# Patient Record
Sex: MALE | Race: WHITE | NOT HISPANIC OR LATINO | Employment: UNEMPLOYED | URBAN - METROPOLITAN AREA
[De-identification: names, ages, dates, MRNs, and addresses within clinical notes are randomized per-mention and may not be internally consistent; named-entity substitution may affect disease eponyms.]

---

## 2022-03-28 PROCEDURE — 99283 EMERGENCY DEPT VISIT LOW MDM: CPT | Mod: 25

## 2022-03-29 ENCOUNTER — TELEPHONE (OUTPATIENT)
Dept: ORTHOPEDICS | Facility: CLINIC | Age: 57
End: 2022-03-29
Payer: MEDICAID

## 2022-03-29 ENCOUNTER — HOSPITAL ENCOUNTER (EMERGENCY)
Facility: HOSPITAL | Age: 57
Discharge: HOME | End: 2022-03-29
Attending: EMERGENCY MEDICINE
Payer: MEDICAID

## 2022-03-29 VITALS
HEART RATE: 91 BPM | DIASTOLIC BLOOD PRESSURE: 70 MMHG | TEMPERATURE: 99 F | SYSTOLIC BLOOD PRESSURE: 140 MMHG | BODY MASS INDEX: 27.37 KG/M2 | OXYGEN SATURATION: 99 % | HEIGHT: 68 IN | RESPIRATION RATE: 16 BRPM

## 2022-03-29 DIAGNOSIS — G89.29 CHRONIC PAIN OF RIGHT KNEE: Primary | ICD-10-CM

## 2022-03-29 DIAGNOSIS — M25.561 RIGHT KNEE PAIN: ICD-10-CM

## 2022-03-29 DIAGNOSIS — M25.561 CHRONIC PAIN OF RIGHT KNEE: Primary | ICD-10-CM

## 2022-03-29 PROCEDURE — 25000003 PHARM REV CODE 250: Performed by: NURSE PRACTITIONER

## 2022-03-29 RX ORDER — HYDROCODONE BITARTRATE AND ACETAMINOPHEN 5; 325 MG/1; MG/1
1 TABLET ORAL
Status: COMPLETED | OUTPATIENT
Start: 2022-03-29 | End: 2022-03-29

## 2022-03-29 RX ORDER — HYDROCODONE BITARTRATE AND ACETAMINOPHEN 5; 325 MG/1; MG/1
1 TABLET ORAL EVERY 4 HOURS PRN
Qty: 18 TABLET | Refills: 0 | Status: SHIPPED | OUTPATIENT
Start: 2022-03-29

## 2022-03-29 RX ADMIN — HYDROCODONE BITARTRATE AND ACETAMINOPHEN 1 TABLET: 5; 325 TABLET ORAL at 12:03

## 2022-03-29 NOTE — FIRST PROVIDER EVALUATION
"Medical screening exam completed.  I have conducted a focused provider triage encounter, findings are as follows:    Brief history of present illness:  Patient presents with pain and swelling to the right knee.  Patient reports following and injuring his knee yesterday.  Reports having severe knee injury previously that was never fully healed.    Vitals:    03/28/22 2217 03/28/22 2220   BP:  (!) 164/71   BP Location:  Right arm   Patient Position:  Sitting   Pulse:  (!) 117   Resp:  20   Temp:  99 °F (37.2 °C)   TempSrc:  Oral   SpO2:  98%   Height: 5' 8" (1.727 m)        Pertinent physical exam:      Brief workup plan:      Preliminary workup initiated; this workup will be continued and followed by the physician or advanced practice provider that is assigned to the patient when roomed.  "

## 2022-03-29 NOTE — TELEPHONE ENCOUNTER
Returned call to patient and left a message that [facility] is not accepting new Medicaid patient's at this time with the Medicaid Escalation Hotline number.

## 2022-03-29 NOTE — TELEPHONE ENCOUNTER
----- Message from Brianna Cardenas sent at 3/29/2022  2:02 PM CDT -----  Contact: Evelyn/ spouse  Evelyn is needing a callback to get the patient scheduled for his knee. He was seen in the E.D on 3/29/2022.  Please call her back at [phone]

## 2022-03-29 NOTE — ED PROVIDER NOTES
Encounter Date: 3/28/2022       History     Chief Complaint   Patient presents with    Knee Injury     Over a year ago injured R knee and did not do the follow ups. Hit same knee last nigh when tripped out of a boat. Knee swollen and red.      Right knee pain and swellling after fall yesterday. History of knee fracture.  No distress noted at this time.  No relief with meds taken at home.        Review of patient's allergies indicates:  No Known Allergies  Past Medical History:   Diagnosis Date    HTN (hypertension)      Past Surgical History:   Procedure Laterality Date    HERNIA REPAIR       History reviewed. No pertinent family history.  Social History     Tobacco Use    Smoking status: Never Smoker    Smokeless tobacco: Current User     Types: Snuff   Substance Use Topics    Alcohol use: Yes    Drug use: Never     Review of Systems   Constitutional: Negative for fever.   HENT: Negative for sore throat.    Respiratory: Negative for shortness of breath.    Cardiovascular: Negative for chest pain.   Gastrointestinal: Negative for nausea.   Genitourinary: Negative for dysuria.   Musculoskeletal: Positive for arthralgias (right knee). Negative for back pain.   Skin: Negative for rash.   Neurological: Negative for weakness.   Hematological: Does not bruise/bleed easily.       Physical Exam     Initial Vitals [03/28/22 2220]   BP Pulse Resp Temp SpO2   (!) 164/71 (!) 117 20 99 °F (37.2 °C) 98 %      MAP       --         Physical Exam    Nursing note and vitals reviewed.  Constitutional: He appears well-developed and well-nourished.   HENT:   Head: Normocephalic and atraumatic.   Eyes: Conjunctivae and EOM are normal. Pupils are equal, round, and reactive to light.   Neck: Neck supple.   Normal range of motion.  Cardiovascular: Normal rate, regular rhythm, normal heart sounds and intact distal pulses.   Pulmonary/Chest: Breath sounds normal.   Abdominal: Abdomen is soft. Bowel sounds are normal.   Musculoskeletal:          General: Tenderness (right knee) and edema (right knee) present.      Cervical back: Normal range of motion and neck supple.     Neurological: He is alert and oriented to person, place, and time. He has normal strength and normal reflexes.   Skin: Skin is warm and dry. Capillary refill takes less than 2 seconds.   Psychiatric: He has a normal mood and affect. His behavior is normal. Judgment and thought content normal.         ED Course   Procedures  Labs Reviewed - No data to display       Imaging Results          X-Ray Knee Complete 4 Or More Views Right (Final result)  Result time 03/28/22 22:56:45    Final result by Nohemy Estrada MD (03/28/22 22:56:45)                 Impression:      Complete destruction of the knee joint with multiple cystic spaces and irregularity of the joint space extend to the tibial plateau and the femoral condyle.  Correlate clinically for a chronic process.    No acute fracture or dislocation    Consider MRI of the right knee with and without contrast on a nonemergent basis for further evaluation      Electronically signed by: Sean Slater  Date:    03/28/2022  Time:    22:56             Narrative:    EXAMINATION:  XR KNEE COMP 4 OR MORE VIEWS RIGHT    CLINICAL HISTORY:  Pain in right knee    TECHNIQUE:  AP, lateral, and Merchant views of the right knee were performed.    COMPARISON:  None    FINDINGS:  Severe irregularity of the joint space of the knee.  No acute fracture or dislocation.  The patella is not identified.  Periosteal reaction involving the distal femur and proximal tibia consistent with chronicity.                                 Medications   HYDROcodone-acetaminophen 5-325 mg per tablet 1 tablet (1 tablet Oral Given 3/29/22 0051)     Medical Decision Making:   ED Management:  I discussed with patient and/or family/caretaker that evaluation in the ED does not suggest any emergent or life threatening medical conditions requiring immediate intervention beyond  what was provided in the ED, and I believe patient is safe for discharge. Regardless, an unremarkable evaluation in the ED does not preclude the development or presence of a serious of life threatening condition. As such, patient was instructed to return immediately for any worsening or change in current symptoms.                        Clinical Impression:   Final diagnoses:  [M25.561] Right knee pain  [M25.561, G89.29] Chronic pain of right knee (Primary)          ED Disposition Condition    Discharge Stable        ED Prescriptions     Medication Sig Dispense Start Date End Date Auth. Provider    HYDROcodone-acetaminophen (NORCO) 5-325 mg per tablet Take 1 tablet by mouth every 4 (four) hours as needed for Pain. 18 tablet 3/29/2022  Herbert Brewer NP        Follow-up Information     Follow up With Specialties Details Why Contact Info    [facility]  Schedule an appointment as soon as possible for a visit   [address] [place] [address] [address]  [place] [id number]  [phone]             Herbert Brewer NP  04/02/22 7000

## 2023-01-01 ENCOUNTER — HOSPITAL ENCOUNTER (INPATIENT)
Facility: HOSPITAL | Age: 58
LOS: 2 days | Discharge: HOME | DRG: 369 | End: 2023-12-31
Attending: EMERGENCY MEDICINE | Admitting: INTERNAL MEDICINE
Payer: MEDICAID

## 2023-01-01 VITALS
RESPIRATION RATE: 18 BRPM | WEIGHT: 140.38 LBS | HEART RATE: 85 BPM | OXYGEN SATURATION: 98 % | SYSTOLIC BLOOD PRESSURE: 118 MMHG | BODY MASS INDEX: 21.28 KG/M2 | HEIGHT: 68 IN | TEMPERATURE: 98 F | DIASTOLIC BLOOD PRESSURE: 66 MMHG

## 2023-01-01 DIAGNOSIS — R19.8 PEPTIC ULCER SYMPTOMS: ICD-10-CM

## 2023-01-01 DIAGNOSIS — I50.42 CHRONIC COMBINED SYSTOLIC AND DIASTOLIC HEART FAILURE: ICD-10-CM

## 2023-01-01 DIAGNOSIS — D64.9 SYMPTOMATIC ANEMIA: ICD-10-CM

## 2023-01-01 DIAGNOSIS — K92.2 ACUTE UPPER GI BLEED: Primary | ICD-10-CM

## 2023-01-01 DIAGNOSIS — R07.9 CHEST PAIN: ICD-10-CM

## 2023-01-01 DIAGNOSIS — D62 ACUTE BLOOD LOSS ANEMIA: ICD-10-CM

## 2023-01-01 DIAGNOSIS — R10.13 ACUTE EPIGASTRIC PAIN: ICD-10-CM

## 2023-01-01 DIAGNOSIS — K92.2 UPPER GI BLEED: ICD-10-CM

## 2023-01-01 DIAGNOSIS — K92.1 MELENA: ICD-10-CM

## 2023-01-01 LAB
ABO + RH BLD: NORMAL
ALBUMIN SERPL BCP-MCNC: 2.8 G/DL (ref 3.5–5.2)
ALBUMIN SERPL BCP-MCNC: 2.9 G/DL (ref 3.5–5.2)
ALP SERPL-CCNC: 133 U/L (ref 55–135)
ALP SERPL-CCNC: 143 U/L (ref 55–135)
ALT SERPL W/O P-5'-P-CCNC: 13 U/L (ref 10–44)
ALT SERPL W/O P-5'-P-CCNC: 15 U/L (ref 10–44)
ANION GAP SERPL CALC-SCNC: 10 MMOL/L (ref 8–16)
ANION GAP SERPL CALC-SCNC: 10 MMOL/L (ref 8–16)
AST SERPL-CCNC: 22 U/L (ref 10–40)
AST SERPL-CCNC: 23 U/L (ref 10–40)
BASOPHILS # BLD AUTO: 0.03 K/UL (ref 0–0.2)
BASOPHILS # BLD AUTO: 0.03 K/UL (ref 0–0.2)
BASOPHILS # BLD AUTO: 0.04 K/UL (ref 0–0.2)
BASOPHILS # BLD AUTO: 0.04 K/UL (ref 0–0.2)
BASOPHILS # BLD AUTO: 0.06 K/UL (ref 0–0.2)
BASOPHILS # BLD AUTO: 0.07 K/UL (ref 0–0.2)
BASOPHILS NFR BLD: 0.5 % (ref 0–1.9)
BASOPHILS NFR BLD: 0.7 % (ref 0–1.9)
BASOPHILS NFR BLD: 0.8 % (ref 0–1.9)
BASOPHILS NFR BLD: 0.8 % (ref 0–1.9)
BILIRUB SERPL-MCNC: 0.6 MG/DL (ref 0.1–1)
BILIRUB SERPL-MCNC: 0.9 MG/DL (ref 0.1–1)
BLD GP AB SCN CELLS X3 SERPL QL: NORMAL
BLD PROD TYP BPU: NORMAL
BLOOD UNIT EXPIRATION DATE: NORMAL
BLOOD UNIT TYPE CODE: 5100
BLOOD UNIT TYPE: NORMAL
BUN SERPL-MCNC: 15 MG/DL (ref 6–20)
BUN SERPL-MCNC: 16 MG/DL (ref 6–20)
CALCIUM SERPL-MCNC: 8.4 MG/DL (ref 8.7–10.5)
CALCIUM SERPL-MCNC: 8.4 MG/DL (ref 8.7–10.5)
CHLORIDE SERPL-SCNC: 108 MMOL/L (ref 95–110)
CHLORIDE SERPL-SCNC: 109 MMOL/L (ref 95–110)
CO2 SERPL-SCNC: 19 MMOL/L (ref 23–29)
CO2 SERPL-SCNC: 21 MMOL/L (ref 23–29)
CODING SYSTEM: NORMAL
CREAT SERPL-MCNC: 1.2 MG/DL (ref 0.5–1.4)
CREAT SERPL-MCNC: 1.2 MG/DL (ref 0.5–1.4)
CROSSMATCH INTERPRETATION: NORMAL
DIFFERENTIAL METHOD BLD: ABNORMAL
DISPENSE STATUS: NORMAL
EOSINOPHIL # BLD AUTO: 0.3 K/UL (ref 0–0.5)
EOSINOPHIL # BLD AUTO: 0.3 K/UL (ref 0–0.5)
EOSINOPHIL # BLD AUTO: 0.4 K/UL (ref 0–0.5)
EOSINOPHIL NFR BLD: 10.5 % (ref 0–8)
EOSINOPHIL NFR BLD: 3.6 % (ref 0–8)
EOSINOPHIL NFR BLD: 4.4 % (ref 0–8)
EOSINOPHIL NFR BLD: 4.7 % (ref 0–8)
EOSINOPHIL NFR BLD: 5.1 % (ref 0–8)
EOSINOPHIL NFR BLD: 5.4 % (ref 0–8)
ERYTHROCYTE [DISTWIDTH] IN BLOOD BY AUTOMATED COUNT: 22 % (ref 11.5–14.5)
ERYTHROCYTE [DISTWIDTH] IN BLOOD BY AUTOMATED COUNT: 22.3 % (ref 11.5–14.5)
ERYTHROCYTE [DISTWIDTH] IN BLOOD BY AUTOMATED COUNT: 22.5 % (ref 11.5–14.5)
ERYTHROCYTE [DISTWIDTH] IN BLOOD BY AUTOMATED COUNT: 22.7 % (ref 11.5–14.5)
ERYTHROCYTE [DISTWIDTH] IN BLOOD BY AUTOMATED COUNT: 22.9 % (ref 11.5–14.5)
ERYTHROCYTE [DISTWIDTH] IN BLOOD BY AUTOMATED COUNT: 24.4 % (ref 11.5–14.5)
EST. GFR  (NO RACE VARIABLE): >60 ML/MIN/1.73 M^2
EST. GFR  (NO RACE VARIABLE): >60 ML/MIN/1.73 M^2
GLUCOSE SERPL-MCNC: 68 MG/DL (ref 70–110)
GLUCOSE SERPL-MCNC: 99 MG/DL (ref 70–110)
HCT VFR BLD AUTO: 22.6 % (ref 40–54)
HCT VFR BLD AUTO: 24.4 % (ref 40–54)
HCT VFR BLD AUTO: 24.5 % (ref 40–54)
HCT VFR BLD AUTO: 25.5 % (ref 40–54)
HCT VFR BLD AUTO: 25.5 % (ref 40–54)
HCT VFR BLD AUTO: 26.5 % (ref 40–54)
HCT VFR BLD AUTO: 27.3 % (ref 40–54)
HGB BLD-MCNC: 6.4 G/DL (ref 14–18)
HGB BLD-MCNC: 7.1 G/DL (ref 14–18)
HGB BLD-MCNC: 7.2 G/DL (ref 14–18)
HGB BLD-MCNC: 7.3 G/DL (ref 14–18)
HGB BLD-MCNC: 7.4 G/DL (ref 14–18)
HGB BLD-MCNC: 7.5 G/DL (ref 14–18)
HGB BLD-MCNC: 7.9 G/DL (ref 14–18)
HYPOCHROMIA BLD QL SMEAR: ABNORMAL
IMM GRANULOCYTES # BLD AUTO: 0.01 K/UL (ref 0–0.04)
IMM GRANULOCYTES # BLD AUTO: 0.02 K/UL (ref 0–0.04)
IMM GRANULOCYTES # BLD AUTO: 0.03 K/UL (ref 0–0.04)
IMM GRANULOCYTES # BLD AUTO: 0.03 K/UL (ref 0–0.04)
IMM GRANULOCYTES # BLD AUTO: 0.04 K/UL (ref 0–0.04)
IMM GRANULOCYTES # BLD AUTO: 0.04 K/UL (ref 0–0.04)
IMM GRANULOCYTES NFR BLD AUTO: 0.3 % (ref 0–0.5)
IMM GRANULOCYTES NFR BLD AUTO: 0.3 % (ref 0–0.5)
IMM GRANULOCYTES NFR BLD AUTO: 0.4 % (ref 0–0.5)
IMM GRANULOCYTES NFR BLD AUTO: 0.4 % (ref 0–0.5)
IMM GRANULOCYTES NFR BLD AUTO: 0.5 % (ref 0–0.5)
IMM GRANULOCYTES NFR BLD AUTO: 0.5 % (ref 0–0.5)
LIPASE SERPL-CCNC: 27 U/L (ref 4–60)
LYMPHOCYTES # BLD AUTO: 0.7 K/UL (ref 1–4.8)
LYMPHOCYTES # BLD AUTO: 1 K/UL (ref 1–4.8)
LYMPHOCYTES # BLD AUTO: 1.1 K/UL (ref 1–4.8)
LYMPHOCYTES # BLD AUTO: 1.2 K/UL (ref 1–4.8)
LYMPHOCYTES # BLD AUTO: 1.3 K/UL (ref 1–4.8)
LYMPHOCYTES # BLD AUTO: 1.6 K/UL (ref 1–4.8)
LYMPHOCYTES NFR BLD: 12.5 % (ref 18–48)
LYMPHOCYTES NFR BLD: 14 % (ref 18–48)
LYMPHOCYTES NFR BLD: 15.5 % (ref 18–48)
LYMPHOCYTES NFR BLD: 15.9 % (ref 18–48)
LYMPHOCYTES NFR BLD: 17.8 % (ref 18–48)
LYMPHOCYTES NFR BLD: 19.3 % (ref 18–48)
MCH RBC QN AUTO: 21.5 PG (ref 27–31)
MCH RBC QN AUTO: 22.1 PG (ref 27–31)
MCH RBC QN AUTO: 22.1 PG (ref 27–31)
MCH RBC QN AUTO: 22.3 PG (ref 27–31)
MCH RBC QN AUTO: 22.4 PG (ref 27–31)
MCH RBC QN AUTO: 22.7 PG (ref 27–31)
MCHC RBC AUTO-ENTMCNC: 28.3 G/DL (ref 32–36)
MCHC RBC AUTO-ENTMCNC: 28.3 G/DL (ref 32–36)
MCHC RBC AUTO-ENTMCNC: 28.9 G/DL (ref 32–36)
MCHC RBC AUTO-ENTMCNC: 29 G/DL (ref 32–36)
MCHC RBC AUTO-ENTMCNC: 29.1 G/DL (ref 32–36)
MCHC RBC AUTO-ENTMCNC: 29.4 G/DL (ref 32–36)
MCV RBC AUTO: 76 FL (ref 82–98)
MCV RBC AUTO: 76 FL (ref 82–98)
MCV RBC AUTO: 77 FL (ref 82–98)
MCV RBC AUTO: 77 FL (ref 82–98)
MCV RBC AUTO: 78 FL (ref 82–98)
MCV RBC AUTO: 78 FL (ref 82–98)
MONOCYTES # BLD AUTO: 0.5 K/UL (ref 0.3–1)
MONOCYTES # BLD AUTO: 0.6 K/UL (ref 0.3–1)
MONOCYTES # BLD AUTO: 0.7 K/UL (ref 0.3–1)
MONOCYTES # BLD AUTO: 0.8 K/UL (ref 0.3–1)
MONOCYTES NFR BLD: 13.8 % (ref 4–15)
MONOCYTES NFR BLD: 7.9 % (ref 4–15)
MONOCYTES NFR BLD: 8.2 % (ref 4–15)
MONOCYTES NFR BLD: 8.3 % (ref 4–15)
MONOCYTES NFR BLD: 8.5 % (ref 4–15)
MONOCYTES NFR BLD: 9.5 % (ref 4–15)
NEUTROPHILS # BLD AUTO: 2.3 K/UL (ref 1.8–7.7)
NEUTROPHILS # BLD AUTO: 4.2 K/UL (ref 1.8–7.7)
NEUTROPHILS # BLD AUTO: 5.4 K/UL (ref 1.8–7.7)
NEUTROPHILS # BLD AUTO: 5.7 K/UL (ref 1.8–7.7)
NEUTROPHILS # BLD AUTO: 6.3 K/UL (ref 1.8–7.7)
NEUTROPHILS # BLD AUTO: 6.4 K/UL (ref 1.8–7.7)
NEUTROPHILS NFR BLD: 56.8 % (ref 38–73)
NEUTROPHILS NFR BLD: 65 % (ref 38–73)
NEUTROPHILS NFR BLD: 69.7 % (ref 38–73)
NEUTROPHILS NFR BLD: 70.3 % (ref 38–73)
NEUTROPHILS NFR BLD: 72.4 % (ref 38–73)
NEUTROPHILS NFR BLD: 74.7 % (ref 38–73)
NRBC BLD-RTO: 0 /100 WBC
NUM UNITS TRANS PACKED RBC: NORMAL
PLATELET # BLD AUTO: 240 K/UL (ref 150–450)
PLATELET # BLD AUTO: 252 K/UL (ref 150–450)
PLATELET # BLD AUTO: 256 K/UL (ref 150–450)
PLATELET # BLD AUTO: 274 K/UL (ref 150–450)
PLATELET # BLD AUTO: 277 K/UL (ref 150–450)
PLATELET # BLD AUTO: 292 K/UL (ref 150–450)
PLATELET BLD QL SMEAR: ABNORMAL
PMV BLD AUTO: 9.3 FL (ref 9.2–12.9)
PMV BLD AUTO: 9.3 FL (ref 9.2–12.9)
PMV BLD AUTO: 9.4 FL (ref 9.2–12.9)
PMV BLD AUTO: 9.4 FL (ref 9.2–12.9)
PMV BLD AUTO: 9.5 FL (ref 9.2–12.9)
PMV BLD AUTO: 9.8 FL (ref 9.2–12.9)
POIKILOCYTOSIS BLD QL SMEAR: SLIGHT
POTASSIUM SERPL-SCNC: 4.3 MMOL/L (ref 3.5–5.1)
POTASSIUM SERPL-SCNC: 4.4 MMOL/L (ref 3.5–5.1)
PROT SERPL-MCNC: 6.1 G/DL (ref 6–8.4)
PROT SERPL-MCNC: 6.2 G/DL (ref 6–8.4)
RBC # BLD AUTO: 2.98 M/UL (ref 4.6–6.2)
RBC # BLD AUTO: 3.17 M/UL (ref 4.6–6.2)
RBC # BLD AUTO: 3.21 M/UL (ref 4.6–6.2)
RBC # BLD AUTO: 3.32 M/UL (ref 4.6–6.2)
RBC # BLD AUTO: 3.4 M/UL (ref 4.6–6.2)
RBC # BLD AUTO: 3.52 M/UL (ref 4.6–6.2)
SODIUM SERPL-SCNC: 137 MMOL/L (ref 136–145)
SODIUM SERPL-SCNC: 140 MMOL/L (ref 136–145)
SPECIMEN OUTDATE: NORMAL
WBC # BLD AUTO: 4 K/UL (ref 3.9–12.7)
WBC # BLD AUTO: 6.09 K/UL (ref 3.9–12.7)
WBC # BLD AUTO: 8.15 K/UL (ref 3.9–12.7)
WBC # BLD AUTO: 8.31 K/UL (ref 3.9–12.7)
WBC # BLD AUTO: 8.51 K/UL (ref 3.9–12.7)
WBC # BLD AUTO: 8.69 K/UL (ref 3.9–12.7)

## 2023-01-01 PROCEDURE — C9113 INJ PANTOPRAZOLE SODIUM, VIA: HCPCS | Performed by: EMERGENCY MEDICINE

## 2023-01-01 PROCEDURE — 11000001 HC ACUTE MED/SURG PRIVATE ROOM

## 2023-01-01 PROCEDURE — 25000003 PHARM REV CODE 250: Performed by: EMERGENCY MEDICINE

## 2023-01-01 PROCEDURE — 63600175 PHARM REV CODE 636 W HCPCS: Performed by: INTERNAL MEDICINE

## 2023-01-01 PROCEDURE — 25000003 PHARM REV CODE 250: Performed by: INTERNAL MEDICINE

## 2023-01-01 PROCEDURE — 37000008 HC ANESTHESIA 1ST 15 MINUTES: Performed by: INTERNAL MEDICINE

## 2023-01-01 PROCEDURE — 85025 COMPLETE CBC W/AUTO DIFF WBC: CPT | Performed by: EMERGENCY MEDICINE

## 2023-01-01 PROCEDURE — 63600175 PHARM REV CODE 636 W HCPCS: Performed by: EMERGENCY MEDICINE

## 2023-01-01 PROCEDURE — 36415 COLL VENOUS BLD VENIPUNCTURE: CPT | Mod: XB | Performed by: INTERNAL MEDICINE

## 2023-01-01 PROCEDURE — 36415 COLL VENOUS BLD VENIPUNCTURE: CPT | Performed by: EMERGENCY MEDICINE

## 2023-01-01 PROCEDURE — 43255 EGD CONTROL BLEEDING ANY: CPT | Performed by: INTERNAL MEDICINE

## 2023-01-01 PROCEDURE — 43255 EGD CONTROL BLEEDING ANY: CPT | Mod: ,,, | Performed by: INTERNAL MEDICINE

## 2023-01-01 PROCEDURE — C9113 INJ PANTOPRAZOLE SODIUM, VIA: HCPCS | Performed by: INTERNAL MEDICINE

## 2023-01-01 PROCEDURE — 85025 COMPLETE CBC W/AUTO DIFF WBC: CPT | Performed by: INTERNAL MEDICINE

## 2023-01-01 PROCEDURE — 36430 TRANSFUSION BLD/BLD COMPNT: CPT

## 2023-01-01 PROCEDURE — 0W3P8ZZ CONTROL BLEEDING IN GASTROINTESTINAL TRACT, VIA NATURAL OR ARTIFICIAL OPENING ENDOSCOPIC: ICD-10-PCS | Performed by: INTERNAL MEDICINE

## 2023-01-01 PROCEDURE — 99232 SBSQ HOSP IP/OBS MODERATE 35: CPT | Mod: ,,, | Performed by: INTERNAL MEDICINE

## 2023-01-01 PROCEDURE — 80053 COMPREHEN METABOLIC PANEL: CPT | Performed by: INTERNAL MEDICINE

## 2023-01-01 PROCEDURE — 86850 RBC ANTIBODY SCREEN: CPT | Performed by: EMERGENCY MEDICINE

## 2023-01-01 PROCEDURE — 80053 COMPREHEN METABOLIC PANEL: CPT | Performed by: EMERGENCY MEDICINE

## 2023-01-01 PROCEDURE — 83690 ASSAY OF LIPASE: CPT | Performed by: EMERGENCY MEDICINE

## 2023-01-01 PROCEDURE — P9016 RBC LEUKOCYTES REDUCED: HCPCS | Performed by: EMERGENCY MEDICINE

## 2023-01-01 PROCEDURE — 86920 COMPATIBILITY TEST SPIN: CPT | Performed by: EMERGENCY MEDICINE

## 2023-01-01 PROCEDURE — 85018 HEMOGLOBIN: CPT | Performed by: INTERNAL MEDICINE

## 2023-01-01 PROCEDURE — 85014 HEMATOCRIT: CPT | Performed by: INTERNAL MEDICINE

## 2023-01-01 PROCEDURE — 96375 TX/PRO/DX INJ NEW DRUG ADDON: CPT

## 2023-01-01 PROCEDURE — 30233N1 TRANSFUSION OF NONAUTOLOGOUS RED BLOOD CELLS INTO PERIPHERAL VEIN, PERCUTANEOUS APPROACH: ICD-10-PCS | Performed by: INTERNAL MEDICINE

## 2023-01-01 PROCEDURE — 96374 THER/PROPH/DIAG INJ IV PUSH: CPT

## 2023-01-01 PROCEDURE — 25000003 PHARM REV CODE 250: Performed by: HOSPITALIST

## 2023-01-01 PROCEDURE — 3E0G8GC INTRODUCTION OF OTHER THERAPEUTIC SUBSTANCE INTO UPPER GI, VIA NATURAL OR ARTIFICIAL OPENING ENDOSCOPIC: ICD-10-PCS | Performed by: INTERNAL MEDICINE

## 2023-01-01 PROCEDURE — 99285 EMERGENCY DEPT VISIT HI MDM: CPT | Mod: 25

## 2023-01-01 PROCEDURE — 27200997: Performed by: INTERNAL MEDICINE

## 2023-01-01 PROCEDURE — 99223 1ST HOSP IP/OBS HIGH 75: CPT | Mod: ,,, | Performed by: INTERNAL MEDICINE

## 2023-01-01 PROCEDURE — 37000009 HC ANESTHESIA EA ADD 15 MINS: Performed by: INTERNAL MEDICINE

## 2023-01-01 PROCEDURE — 27201028 HC NEEDLE, SCLERO: Performed by: INTERNAL MEDICINE

## 2023-01-01 PROCEDURE — 85025 COMPLETE CBC W/AUTO DIFF WBC: CPT | Mod: 91 | Performed by: INTERNAL MEDICINE

## 2023-01-01 RX ORDER — MAG HYDROX/ALUMINUM HYD/SIMETH 200-200-20
30 SUSPENSION, ORAL (FINAL DOSE FORM) ORAL
Status: COMPLETED | OUTPATIENT
Start: 2023-01-01 | End: 2023-01-01

## 2023-01-01 RX ORDER — ONDANSETRON 2 MG/ML
4 INJECTION INTRAMUSCULAR; INTRAVENOUS EVERY 6 HOURS PRN
Status: DISCONTINUED | OUTPATIENT
Start: 2023-01-01 | End: 2023-01-01 | Stop reason: HOSPADM

## 2023-01-01 RX ORDER — SODIUM CHLORIDE 0.9 % (FLUSH) 0.9 %
10 SYRINGE (ML) INJECTION EVERY 12 HOURS PRN
Status: DISCONTINUED | OUTPATIENT
Start: 2023-01-01 | End: 2023-01-01 | Stop reason: HOSPADM

## 2023-01-01 RX ORDER — FUROSEMIDE 40 MG/1
1 TABLET ORAL EVERY MORNING
COMMUNITY
Start: 2023-02-05

## 2023-01-01 RX ORDER — GUAIFENESIN 100 MG/5ML
200 SOLUTION ORAL EVERY 4 HOURS PRN
Status: DISCONTINUED | OUTPATIENT
Start: 2023-01-01 | End: 2023-01-01 | Stop reason: HOSPADM

## 2023-01-01 RX ORDER — HYDROCODONE BITARTRATE AND ACETAMINOPHEN 500; 5 MG/1; MG/1
TABLET ORAL
Status: DISCONTINUED | OUTPATIENT
Start: 2023-01-01 | End: 2023-01-01 | Stop reason: HOSPADM

## 2023-01-01 RX ORDER — IBUPROFEN 200 MG
16 TABLET ORAL
Status: DISCONTINUED | OUTPATIENT
Start: 2023-01-01 | End: 2023-01-01 | Stop reason: HOSPADM

## 2023-01-01 RX ORDER — ASPIRIN 81 MG/1
81 TABLET ORAL DAILY
Status: ON HOLD | COMMUNITY
Start: 2023-02-05 | End: 2023-01-01 | Stop reason: HOSPADM

## 2023-01-01 RX ORDER — DICLOFENAC SODIUM 10 MG/G
4 GEL TOPICAL 4 TIMES DAILY
COMMUNITY
Start: 2023-01-01

## 2023-01-01 RX ORDER — MORPHINE SULFATE 4 MG/ML
2 INJECTION, SOLUTION INTRAMUSCULAR; INTRAVENOUS EVERY 4 HOURS PRN
Status: DISCONTINUED | OUTPATIENT
Start: 2023-01-01 | End: 2023-01-01

## 2023-01-01 RX ORDER — PANTOPRAZOLE SODIUM 40 MG/1
40 TABLET, DELAYED RELEASE ORAL 2 TIMES DAILY
Qty: 60 TABLET | Refills: 1 | Status: SHIPPED | OUTPATIENT
Start: 2023-01-01

## 2023-01-01 RX ORDER — FERROUS SULFATE 325(65) MG
325 TABLET ORAL
COMMUNITY
Start: 2023-01-01 | End: 2024-12-05

## 2023-01-01 RX ORDER — LIDOCAINE HYDROCHLORIDE 20 MG/ML
15 SOLUTION OROPHARYNGEAL
Status: COMPLETED | OUTPATIENT
Start: 2023-01-01 | End: 2023-01-01

## 2023-01-01 RX ORDER — SUCRALFATE 1 G/10ML
1 SUSPENSION ORAL EVERY 6 HOURS
Qty: 414 ML | Refills: 1 | Status: SHIPPED | OUTPATIENT
Start: 2024-01-01

## 2023-01-01 RX ORDER — LABETALOL HYDROCHLORIDE 5 MG/ML
10 INJECTION, SOLUTION INTRAVENOUS EVERY 6 HOURS PRN
Status: DISCONTINUED | OUTPATIENT
Start: 2023-01-01 | End: 2023-01-01 | Stop reason: HOSPADM

## 2023-01-01 RX ORDER — EPINEPHRINE CONVENIENCE KIT 1 MG/ML(1)
KIT INTRAMUSCULAR; SUBCUTANEOUS
Status: COMPLETED | OUTPATIENT
Start: 2023-01-01 | End: 2023-01-01

## 2023-01-01 RX ORDER — PANTOPRAZOLE SODIUM 40 MG/10ML
80 INJECTION, POWDER, LYOPHILIZED, FOR SOLUTION INTRAVENOUS
Status: COMPLETED | OUTPATIENT
Start: 2023-01-01 | End: 2023-01-01

## 2023-01-01 RX ORDER — PANTOPRAZOLE SODIUM 40 MG/10ML
40 INJECTION, POWDER, LYOPHILIZED, FOR SOLUTION INTRAVENOUS 2 TIMES DAILY
Status: DISCONTINUED | OUTPATIENT
Start: 2023-01-01 | End: 2023-01-01

## 2023-01-01 RX ORDER — ONDANSETRON 2 MG/ML
8 INJECTION INTRAMUSCULAR; INTRAVENOUS
Status: COMPLETED | OUTPATIENT
Start: 2023-01-01 | End: 2023-01-01

## 2023-01-01 RX ORDER — BENZONATATE 100 MG/1
100 CAPSULE ORAL 3 TIMES DAILY PRN
Qty: 30 CAPSULE | Refills: 0 | Status: SHIPPED | OUTPATIENT
Start: 2023-01-01 | End: 2024-01-01

## 2023-01-01 RX ORDER — NALOXONE HCL 0.4 MG/ML
0.4 VIAL (ML) INJECTION
Status: DISCONTINUED | OUTPATIENT
Start: 2023-01-01 | End: 2023-01-01 | Stop reason: HOSPADM

## 2023-01-01 RX ORDER — HYDROCODONE BITARTRATE AND ACETAMINOPHEN 10; 325 MG/1; MG/1
1 TABLET ORAL EVERY 6 HOURS PRN
Status: DISCONTINUED | OUTPATIENT
Start: 2023-01-01 | End: 2023-01-01 | Stop reason: HOSPADM

## 2023-01-01 RX ORDER — SUCRALFATE 1 G/10ML
1 SUSPENSION ORAL EVERY 6 HOURS
Status: DISCONTINUED | OUTPATIENT
Start: 2023-01-01 | End: 2023-01-01 | Stop reason: HOSPADM

## 2023-01-01 RX ORDER — SODIUM CHLORIDE, SODIUM LACTATE, POTASSIUM CHLORIDE, CALCIUM CHLORIDE 600; 310; 30; 20 MG/100ML; MG/100ML; MG/100ML; MG/100ML
INJECTION, SOLUTION INTRAVENOUS CONTINUOUS
Status: DISCONTINUED | OUTPATIENT
Start: 2023-01-01 | End: 2023-01-01

## 2023-01-01 RX ORDER — SODIUM CHLORIDE 9 MG/ML
INJECTION, SOLUTION INTRAVENOUS CONTINUOUS
Status: ACTIVE | OUTPATIENT
Start: 2023-01-01 | End: 2023-01-01

## 2023-01-01 RX ORDER — GLUCAGON 1 MG
1 KIT INJECTION
Status: DISCONTINUED | OUTPATIENT
Start: 2023-01-01 | End: 2023-01-01 | Stop reason: HOSPADM

## 2023-01-01 RX ORDER — PANTOPRAZOLE SODIUM 40 MG/1
40 TABLET, DELAYED RELEASE ORAL DAILY
Status: ON HOLD | COMMUNITY
End: 2023-01-01

## 2023-01-01 RX ORDER — IBUPROFEN 200 MG
24 TABLET ORAL
Status: DISCONTINUED | OUTPATIENT
Start: 2023-01-01 | End: 2023-01-01 | Stop reason: HOSPADM

## 2023-01-01 RX ORDER — AMLODIPINE BESYLATE 5 MG/1
5 TABLET ORAL DAILY
Status: DISCONTINUED | OUTPATIENT
Start: 2023-01-01 | End: 2023-01-01 | Stop reason: HOSPADM

## 2023-01-01 RX ORDER — MORPHINE SULFATE 4 MG/ML
4 INJECTION, SOLUTION INTRAMUSCULAR; INTRAVENOUS
Status: COMPLETED | OUTPATIENT
Start: 2023-01-01 | End: 2023-01-01

## 2023-01-01 RX ORDER — PANTOPRAZOLE SODIUM 40 MG/10ML
40 INJECTION, POWDER, LYOPHILIZED, FOR SOLUTION INTRAVENOUS 2 TIMES DAILY
Status: DISCONTINUED | OUTPATIENT
Start: 2023-01-01 | End: 2023-01-01 | Stop reason: HOSPADM

## 2023-01-01 RX ORDER — MORPHINE SULFATE 4 MG/ML
4 INJECTION, SOLUTION INTRAMUSCULAR; INTRAVENOUS EVERY 4 HOURS PRN
Status: DISCONTINUED | OUTPATIENT
Start: 2023-01-01 | End: 2023-01-01 | Stop reason: HOSPADM

## 2023-01-01 RX ORDER — EMPAGLIFLOZIN 10 MG/1
10 TABLET, FILM COATED ORAL DAILY
COMMUNITY
Start: 2023-02-05

## 2023-01-01 RX ADMIN — PANTOPRAZOLE SODIUM 80 MG: 40 INJECTION, POWDER, FOR SOLUTION INTRAVENOUS at 06:12

## 2023-01-01 RX ADMIN — AMLODIPINE BESYLATE 5 MG: 5 TABLET ORAL at 08:12

## 2023-01-01 RX ADMIN — SUCRALFATE 1 G: 1 SUSPENSION ORAL at 05:12

## 2023-01-01 RX ADMIN — MORPHINE SULFATE 4 MG: 4 INJECTION INTRAVENOUS at 09:12

## 2023-01-01 RX ADMIN — SUCRALFATE 1 G: 1 SUSPENSION ORAL at 11:12

## 2023-01-01 RX ADMIN — ONDANSETRON 8 MG: 2 INJECTION INTRAMUSCULAR; INTRAVENOUS at 05:12

## 2023-01-01 RX ADMIN — SODIUM CHLORIDE: 9 INJECTION, SOLUTION INTRAVENOUS at 07:12

## 2023-01-01 RX ADMIN — LIDOCAINE HYDROCHLORIDE 15 ML: 20 SOLUTION ORAL; TOPICAL at 05:12

## 2023-01-01 RX ADMIN — MORPHINE SULFATE 4 MG: 4 INJECTION INTRAVENOUS at 08:12

## 2023-01-01 RX ADMIN — MORPHINE SULFATE 4 MG: 4 INJECTION INTRAVENOUS at 12:12

## 2023-01-01 RX ADMIN — AMLODIPINE BESYLATE 5 MG: 5 TABLET ORAL at 06:12

## 2023-01-01 RX ADMIN — PANTOPRAZOLE SODIUM 40 MG: 40 INJECTION, POWDER, FOR SOLUTION INTRAVENOUS at 09:12

## 2023-01-01 RX ADMIN — HYDROCODONE BITARTRATE AND ACETAMINOPHEN 1 TABLET: 10; 325 TABLET ORAL at 10:12

## 2023-01-01 RX ADMIN — PANTOPRAZOLE SODIUM 40 MG: 40 INJECTION, POWDER, FOR SOLUTION INTRAVENOUS at 08:12

## 2023-01-01 RX ADMIN — MORPHINE SULFATE 4 MG: 4 INJECTION INTRAVENOUS at 05:12

## 2023-01-01 RX ADMIN — GUAIFENESIN 200 MG: 200 SOLUTION ORAL at 09:12

## 2023-01-01 RX ADMIN — HYDROCODONE BITARTRATE AND ACETAMINOPHEN 1 TABLET: 10; 325 TABLET ORAL at 01:12

## 2023-01-01 RX ADMIN — ALUMINUM HYDROXIDE, MAGNESIUM HYDROXIDE, AND DIMETHICONE 30 ML: 200; 20; 200 SUSPENSION ORAL at 05:12

## 2023-01-01 RX ADMIN — SUCRALFATE 1 G: 1 SUSPENSION ORAL at 12:12

## 2023-12-29 PROBLEM — I50.42 CHRONIC COMBINED SYSTOLIC AND DIASTOLIC HEART FAILURE: Status: ACTIVE | Noted: 2023-12-29

## 2023-12-29 PROBLEM — Z96.641 HISTORY OF TOTAL RIGHT HIP REPLACEMENT: Status: ACTIVE | Noted: 2023-12-29

## 2023-12-29 PROBLEM — I10 PRIMARY HYPERTENSION: Status: ACTIVE | Noted: 2023-12-29

## 2023-12-29 PROBLEM — K92.2 UPPER GI BLEED: Status: ACTIVE | Noted: 2023-12-29

## 2023-12-29 NOTE — Clinical Note
Diagnosis: Acute upper GI bleed [333508]   Future Attending Provider: JORDANA LARA [[id number]]   Admitting Provider:: JORDANA LARA [[id number]]   Reason for IP Medical Treatment  (Clinical interventions that can only be accomplished in the IP setting? ) :: Blood product transfusion, CBC monitoring, GI consult   I certify that Inpatient services for greater than or equal to 2 midnights are medically necessary:: Yes   Plans for Post-Acute care--if anticipated (pick the single best option):: A. No post acute care anticipated at this time   Special Needs:: No Special Needs [1]

## 2023-12-29 NOTE — ED PROVIDER NOTES
SCRIBE #1 NOTE: I, Gemini Corrigan, am scribing for, and in the presence of, Shakeel Robison MD. I have scribed the entire note.       History     Chief Complaint   Patient presents with    Abdominal Pain     Pt. Has been out of his ulcer meds for the past week. He is having black stool, epigastric pain. He is also having hip pain from a recent hip surgery.      HPI  12/29/2023, 4:06 PM  History obtained from the patient    HPI:  Ramesh Johnson Jr. is a 58 y.o. male with a PMH including recent hip surgery who presents to the [facility] emergency department for evaluation of abd pain. Pt c/o N/V, epigastric and LUQ pain since being out of his stomach ulcer meds (says liquid, possibly carafate?) for a week. No other complaints or concerns.     Arrival mode: Ambulance Service        PCP: Tony Mckinney APRN    Review of patient's allergies indicates:   Allergen Reactions    Milk containing products (dairy)       Past Medical History:   Diagnosis Date    Anemia     Arthritis     CHF (congestive heart failure)     Encounter for blood transfusion     HTN (hypertension)     Paroxysmal SVT (supraventricular tachycardia)      Past Surgical History:   Procedure Laterality Date    COLONOSCOPY      ESOPHAGOGASTRODUODENOSCOPY      HERNIA REPAIR      TOTAL HIP ARTHROPLASTY Right        Family History   Problem Relation Age of Onset    Diabetes Mother     Diabetes Father      Social History     Tobacco Use    Smoking status: Never    Smokeless tobacco: Current     Types: Snuff   Substance and Sexual Activity    Alcohol use: Yes    Drug use: Never    Sexual activity: Yes      Review of Systems     Review of Systems   Constitutional: Negative.    HENT: Negative.     Eyes: Negative.    Respiratory: Negative.     Cardiovascular: Negative.    Gastrointestinal:  Positive for abdominal pain, nausea and vomiting. Negative for diarrhea.   Endocrine: Negative.    Genitourinary: Negative.    Musculoskeletal: Negative.     Skin: Negative.    Allergic/Immunologic: Negative.    Neurological: Negative.    Hematological: Negative.    Psychiatric/Behavioral: Negative.     All other systems reviewed and are negative.       Physical Exam     Initial Vitals [12/29/23 1549]   BP Pulse Resp Temp SpO2   (!) 183/76 100 18 98.6 °F (37 °C) (!) 94 %      MAP       --          Physical Exam  Nursing notes and vital signs reviewed.  Constitutional: Patient is in severe distress.   Head: Normocephalic. Atraumatic.   Eyes: Conjunctivae are not pale. No scleral icterus.   ENT: Mucous membranes moist.   Neck: Supple.   Cardiovascular: Tachycardic.  Regular rhythm. No murmurs, rubs, or gallops.    Pulmonary: No respiratory distress. Clear to auscultation bilaterally.    Abdominal: Non-distended. Tense. Epigastric and LUQ tenderness.  Musculoskeletal: Moves all extremities. No obvious deformities. No edema.   Skin: Warm and dry.   Neurological:  Alert, awake, and appropriate. Normal speech. No acute lateralizing neurologic deficits appreciated.   Psychiatric: Normal affect.       ED Course   Critical Care    Date/Time: 12/29/2023 5:37 PM    Performed by: Shakeel Robison MD  Authorized by: Shakeel Robison MD  Direct patient critical care time: 12 minutes  Additional history critical care time: 5 minutes  Ordering / reviewing critical care time: 7 minutes  Documentation critical care time: 7 minutes  Consulting other physicians critical care time: 4 minutes  Total critical care time (exclusive of procedural time) : 35 minutes  Critical care time was exclusive of separately billable procedures and treating other patients and teaching time.  Critical care was necessary to treat or prevent imminent or life-threatening deterioration of the following conditions: severe anemia requiring blood transfusion; GI bleed.  Critical care was time spent personally by me on the following activities: blood draw for specimens, development of treatment plan with  patient or surrogate, interpretation of cardiac output measurements, evaluation of patient's response to treatment, ordering and review of laboratory studies, ordering and performing treatments and interventions, obtaining history from patient or surrogate, examination of patient, review of old charts, re-evaluation of patient's condition, pulse oximetry, ordering and review of radiographic studies and discussions with consultants.        Vitals:    12/30/23 0033 12/30/23 0037 12/30/23 0100 12/30/23 0111   BP: (!) 174/86   (!) 160/80   Pulse: 109  106 106   Resp: 18 18  16   Temp: 98.1 °F (36.7 °C)   98.2 °F (36.8 °C)   TempSrc: Oral   Oral   SpO2: 95%   97%   Weight:       Height:        12/30/23 0300 12/30/23 0400 12/30/23 0418 12/30/23 0500   BP:   (!) 147/83    Pulse: 103 102 101 101   Resp:   17    Temp:   97.7 °F (36.5 °C)    TempSrc:       SpO2:   97%    Weight:       Height:        12/30/23 0806 12/30/23 0812 12/30/23 0845 12/30/23 0910   BP: (!) 158/83  (!) 145/72 (!) 140/73   Pulse: 97  93 104   Resp: 17 17 16 16   Temp: 98.3 °F (36.8 °C)  99.3 °F (37.4 °C)    TempSrc: Tympanic  Temporal Temporal   SpO2: 97%  100% (!) 92%   Weight:       Height:        12/30/23 0918 12/30/23 0930 12/30/23 0947   BP: 139/73 (!) 153/79 (!) 162/81   Pulse: 102 96 96   Resp: 16  18   Temp:   97.9 °F (36.6 °C)   TempSrc:      SpO2: (!) 93% 96% 96%   Weight:      Height:        Lab Results Interpreted as Abnormal:  Labs Reviewed   CBC W/ AUTO DIFFERENTIAL - Abnormal; Notable for the following components:       Result Value    RBC 2.98 (*)     Hemoglobin 6.4 (*)     Hematocrit 22.6 (*)     MCV 76 (*)     MCH 21.5 (*)     MCHC 28.3 (*)     RDW 24.4 (*)     Gran % 74.7 (*)     Lymph % 12.5 (*)     All other components within normal limits   COMPREHENSIVE METABOLIC PANEL - Abnormal; Notable for the following components:    CO2 21 (*)     Calcium 8.4 (*)     Albumin 2.9 (*)     Alkaline Phosphatase 143 (*)     All other components  within normal limits   LIPASE   TYPE & SCREEN      All Lab Results:  Results for orders placed or performed during the hospital encounter of 12/29/23   CBC auto differential   Result Value Ref Range    WBC 8.51 3.90 - 12.70 K/uL    RBC 2.98 (L) 4.60 - 6.20 M/uL    Hemoglobin 6.4 (L) 14.0 - 18.0 g/dL    Hematocrit 22.6 (L) 40.0 - 54.0 %    MCV 76 (L) 82 - 98 fL    MCH 21.5 (L) 27.0 - 31.0 pg    MCHC 28.3 (L) 32.0 - 36.0 g/dL    RDW 24.4 (H) 11.5 - 14.5 %    Platelets 292 150 - 450 K/uL    MPV 9.3 9.2 - 12.9 fL    Immature Granulocytes 0.4 0.0 - 0.5 %    Gran # (ANC) 6.4 1.8 - 7.7 K/uL    Immature Grans (Abs) 0.03 0.00 - 0.04 K/uL    Lymph # 1.1 1.0 - 4.8 K/uL    Mono # 0.7 0.3 - 1.0 K/uL    Eos # 0.3 0.0 - 0.5 K/uL    Baso # 0.04 0.00 - 0.20 K/uL    nRBC 0 0 /100 WBC    Gran % 74.7 (H) 38.0 - 73.0 %    Lymph % 12.5 (L) 18.0 - 48.0 %    Mono % 8.3 4.0 - 15.0 %    Eosinophil % 3.6 0.0 - 8.0 %    Basophil % 0.5 0.0 - 1.9 %    Platelet Estimate Appears normal     Poik Slight     Hypo Occasional     Differential Method Automated    Comprehensive metabolic panel   Result Value Ref Range    Sodium 140 136 - 145 mmol/L    Potassium 4.3 3.5 - 5.1 mmol/L    Chloride 109 95 - 110 mmol/L    CO2 21 (L) 23 - 29 mmol/L    Glucose 99 70 - 110 mg/dL    BUN 16 6 - 20 mg/dL    Creatinine 1.2 0.5 - 1.4 mg/dL    Calcium 8.4 (L) 8.7 - 10.5 mg/dL    Total Protein 6.2 6.0 - 8.4 g/dL    Albumin 2.9 (L) 3.5 - 5.2 g/dL    Total Bilirubin 0.6 0.1 - 1.0 mg/dL    Alkaline Phosphatase 143 (H) 55 - 135 U/L    AST 22 10 - 40 U/L    ALT 15 10 - 44 U/L    eGFR >60 >60 mL/min/1.73 m^2    Anion Gap 10 8 - 16 mmol/L   Lipase   Result Value Ref Range    Lipase 27 4 - 60 U/L   CBC auto differential   Result Value Ref Range    WBC 8.31 3.90 - 12.70 K/uL    RBC 3.40 (L) 4.60 - 6.20 M/uL    Hemoglobin 7.5 (L) 14.0 - 18.0 g/dL    Hematocrit 26.5 (L) 40.0 - 54.0 %    MCV 78 (L) 82 - 98 fL    MCH 22.1 (L) 27.0 - 31.0 pg    MCHC 28.3 (L) 32.0 - 36.0 g/dL    RDW  22.9 (H) 11.5 - 14.5 %    Platelets 277 150 - 450 K/uL    MPV 9.5 9.2 - 12.9 fL    Immature Granulocytes 0.4 0.0 - 0.5 %    Gran # (ANC) 5.4 1.8 - 7.7 K/uL    Immature Grans (Abs) 0.03 0.00 - 0.04 K/uL    Lymph # 1.6 1.0 - 4.8 K/uL    Mono # 0.8 0.3 - 1.0 K/uL    Eos # 0.4 0.0 - 0.5 K/uL    Baso # 0.06 0.00 - 0.20 K/uL    nRBC 0 0 /100 WBC    Gran % 65.0 38.0 - 73.0 %    Lymph % 19.3 18.0 - 48.0 %    Mono % 9.5 4.0 - 15.0 %    Eosinophil % 5.1 0.0 - 8.0 %    Basophil % 0.7 0.0 - 1.9 %    Differential Method Automated    Comprehensive metabolic panel   Result Value Ref Range    Sodium 137 136 - 145 mmol/L    Potassium 4.4 3.5 - 5.1 mmol/L    Chloride 108 95 - 110 mmol/L    CO2 19 (L) 23 - 29 mmol/L    Glucose 68 (L) 70 - 110 mg/dL    BUN 15 6 - 20 mg/dL    Creatinine 1.2 0.5 - 1.4 mg/dL    Calcium 8.4 (L) 8.7 - 10.5 mg/dL    Total Protein 6.1 6.0 - 8.4 g/dL    Albumin 2.8 (L) 3.5 - 5.2 g/dL    Total Bilirubin 0.9 0.1 - 1.0 mg/dL    Alkaline Phosphatase 133 55 - 135 U/L    AST 23 10 - 40 U/L    ALT 13 10 - 44 U/L    eGFR >60 >60 mL/min/1.73 m^2    Anion Gap 10 8 - 16 mmol/L   CBC auto differential   Result Value Ref Range    WBC 8.15 3.90 - 12.70 K/uL    RBC 3.32 (L) 4.60 - 6.20 M/uL    Hemoglobin 7.4 (L) 14.0 - 18.0 g/dL    Hematocrit 25.5 (L) 40.0 - 54.0 %    MCV 77 (L) 82 - 98 fL    MCH 22.3 (L) 27.0 - 31.0 pg    MCHC 29.0 (L) 32.0 - 36.0 g/dL    RDW 22.7 (H) 11.5 - 14.5 %    Platelets 256 150 - 450 K/uL    MPV 9.4 9.2 - 12.9 fL    Immature Granulocytes 0.5 0.0 - 0.5 %    Gran # (ANC) 5.7 1.8 - 7.7 K/uL    Immature Grans (Abs) 0.04 0.00 - 0.04 K/uL    Lymph # 1.3 1.0 - 4.8 K/uL    Mono # 0.7 0.3 - 1.0 K/uL    Eos # 0.4 0.0 - 0.5 K/uL    Baso # 0.04 0.00 - 0.20 K/uL    nRBC 0 0 /100 WBC    Gran % 70.3 38.0 - 73.0 %    Lymph % 15.5 (L) 18.0 - 48.0 %    Mono % 8.5 4.0 - 15.0 %    Eosinophil % 4.7 0.0 - 8.0 %    Basophil % 0.5 0.0 - 1.9 %    Differential Method Automated    Type & Screen   Result Value Ref Range     Group & Rh O POS     Indirect Kim NEG     Specimen Outdate 01/01/2024 23:59    Prepare RBC 1 Unit   Result Value Ref Range    UNIT NUMBER [id number]     Product Code [id number]     DISPENSE STATUS TRANSFUSED     CODING SYSTEM EXAQ177     Unit Blood Type Code 5100     Unit Blood Type O POS     Unit Expiration [id number]     CROSSMATCH INTERPRETATION Compatible      Imaging Results              X-Ray Chest AP Portable (Final result)  Result time 12/29/23 17:33:31      Final result by Maximiliano Charles MD (12/29/23 17:33:31)                   Impression:      No acute abnormality.      Electronically signed by: Maximiliano Charles  Date:    12/29/2023  Time:    17:33               Narrative:    EXAMINATION:  XR CHEST AP PORTABLE    CLINICAL HISTORY:  Epigastric pain    TECHNIQUE:  Single frontal view of the chest was performed.    COMPARISON:  None    FINDINGS:  The lungs are clear, with normal appearance of pulmonary vasculature and no pleural effusion or pneumothorax.    The cardiac silhouette is normal in size. The hilar and mediastinal contours are unremarkable.    Bones are intact.                                       The emergency physician reviewed the vital signs and test results, which are outlined above.     ED Discussion       5:48 PM: Discussed pt's case with Obie Oakley (Gastroenterology) who states will plan to scope tomorrow.    7:02 PM: Discussed case with Bj Hussein MD (Hospital Medicine). Dr. Hussein agrees with current care and management of pt and accepts admission.   Admitting Service: Hospital Medicine  Admitting Physician: Dr. Hussein  Admit to: Obs    7:03 PM: Re-evaluated pt. I have discussed test results, shared treatment plan, and the need for admission with patient and family at bedside. Pt and family express understanding at this time and agree with all information. All questions answered. Pt and family have no further questions or concerns at this time. Pt is ready for  admit.              ED Medication(s) Administered:  Medications   0.9%  NaCl infusion (for blood administration) (has no administration in time range)   pantoprazole injection 40 mg (40 mg Intravenous Given 12/30/23 0812)   0.9%  NaCl infusion ( Intravenous New Bag 12/29/23 1926)   sodium chloride 0.9% flush 10 mL (has no administration in time range)   naloxone 0.4 mg/mL injection 0.4 mg (has no administration in time range)   glucose chewable tablet 16 g (has no administration in time range)   glucose chewable tablet 24 g (has no administration in time range)   glucagon (human recombinant) injection 1 mg (has no administration in time range)   ondansetron injection 4 mg (has no administration in time range)   morphine injection 4 mg (4 mg Intravenous Given 12/30/23 0812)   morphine injection 2 mg (has no administration in time range)   labetaloL injection 10 mg (0 mg Intravenous Hold 12/29/23 1920)   morphine injection 4 mg (4 mg Intravenous Given 12/29/23 1736)   ondansetron injection 8 mg (8 mg Intravenous Given 12/29/23 1737)   aluminum-magnesium hydroxide-simethicone 200-200-20 mg/5 mL suspension 30 mL (30 mLs Oral Given 12/29/23 1736)     And   LIDOcaine viscous HCl 2% oral solution 15 mL (15 mLs Oral Given 12/29/23 1736)   pantoprazole injection 80 mg (80 mg Intravenous Given 12/29/23 1833)   EPINEPHrine injection (0.1 mg Subcutaneous Given 12/30/23 0901)       Prescription Management: I performed a review of the patient's current Rx medication list as input by nursing staff.    Current Discharge Medication List        CONTINUE these medications which have NOT CHANGED    Details   amlodipine (NORVASC) 5 MG tablet Take 1 tablet (5 mg total) by mouth once daily.  Qty: 30 tablet, Refills: 2      aspirin (ECOTRIN) 81 MG EC tablet Take 81 mg by mouth once daily.      diclofenac sodium (VOLTAREN) 1 % Gel Apply 4 g topically 4 (four) times daily.      ferrous sulfate (FEOSOL) 325 mg (65 mg iron) Tab tablet Take 325  mg by mouth.      furosemide (LASIX) 40 MG tablet Take 1 tablet by mouth every morning.      HYDROcodone-acetaminophen (NORCO) 5-325 mg per tablet Take 1 tablet by mouth every 4 (four) hours as needed for Pain.  Qty: 18 tablet, Refills: 0      JARDIANCE 10 mg tablet Take 10 mg by mouth once daily.      pantoprazole (PROTONIX) 40 MG tablet Take 40 mg by mouth once daily.                    Scribe Attestation:   Scribe #1: I performed the above scribed service and the documentation accurately describes the services I performed. I attest to the accuracy of the note.     Attending:   Physician Attestation Statement for Scribe #1: I, Shakeel Robison MD, personally performed the services described in this documentation, as scribed by Gemini Corrigan, in my presence, and it is both accurate and complete. As with other dictation methods such as dictation software, small errors or inconsistencies may be overlooked due to the goal of spending more face-to-face time with patients.      Clinical Impression       ICD-10-CM ICD-9-CM   1. Acute upper GI bleed  K92.2 578.9   2. Acute epigastric pain  R10.13 789.06     338.19   3. Acute epigastric pain  R10.13 789.06     338.19   4. Melena  K92.1 578.1   5. Peptic ulcer symptoms  R19.8 787.99   6. Chest pain  R07.9 786.50   7. Upper GI bleed  K92.2 578.9   8. Symptomatic anemia  D64.9 285.9   9. Acute blood loss anemia  D62 285.1      ED Disposition Condition    Admit Serious               Shakeel Robison MD  12/30/23 4600

## 2023-12-30 ENCOUNTER — ANESTHESIA EVENT (OUTPATIENT)
Dept: ENDOSCOPY | Facility: HOSPITAL | Age: 58
DRG: 369 | End: 2023-12-30
Payer: MEDICAID

## 2023-12-30 ENCOUNTER — ANESTHESIA (OUTPATIENT)
Dept: ENDOSCOPY | Facility: HOSPITAL | Age: 58
DRG: 369 | End: 2023-12-30
Payer: MEDICAID

## 2023-12-30 PROCEDURE — 63600175 PHARM REV CODE 636 W HCPCS: Performed by: NURSE ANESTHETIST, CERTIFIED REGISTERED

## 2023-12-30 PROCEDURE — 25000003 PHARM REV CODE 250: Performed by: NURSE ANESTHETIST, CERTIFIED REGISTERED

## 2023-12-30 RX ORDER — LIDOCAINE HYDROCHLORIDE 10 MG/ML
INJECTION, SOLUTION EPIDURAL; INFILTRATION; INTRACAUDAL; PERINEURAL
Status: DISCONTINUED | OUTPATIENT
Start: 2023-12-30 | End: 2023-12-30

## 2023-12-30 RX ORDER — PROPOFOL 10 MG/ML
VIAL (ML) INTRAVENOUS
Status: DISCONTINUED | OUTPATIENT
Start: 2023-12-30 | End: 2023-12-30

## 2023-12-30 RX ORDER — SODIUM CHLORIDE, SODIUM LACTATE, POTASSIUM CHLORIDE, CALCIUM CHLORIDE 600; 310; 30; 20 MG/100ML; MG/100ML; MG/100ML; MG/100ML
INJECTION, SOLUTION INTRAVENOUS CONTINUOUS PRN
Status: DISCONTINUED | OUTPATIENT
Start: 2023-12-30 | End: 2023-12-30

## 2023-12-30 RX ADMIN — BENZOCAINE, BUTAMBEN, AND TETRACAINE HYDROCHLORIDE 1 SPRAY: .028; .004; .004 AEROSOL, SPRAY TOPICAL at 08:12

## 2023-12-30 RX ADMIN — PROPOFOL 40 MG: 10 INJECTION, EMULSION INTRAVENOUS at 08:12

## 2023-12-30 RX ADMIN — LIDOCAINE HYDROCHLORIDE 50 MG: 10 SOLUTION INTRAVENOUS at 08:12

## 2023-12-30 RX ADMIN — PROPOFOL 60 MG: 10 INJECTION, EMULSION INTRAVENOUS at 08:12

## 2023-12-30 RX ADMIN — SODIUM CHLORIDE, SODIUM LACTATE, POTASSIUM CHLORIDE, AND CALCIUM CHLORIDE: 600; 310; 30; 20 INJECTION, SOLUTION INTRAVENOUS at 08:12

## 2023-12-30 NOTE — INTERVAL H&P NOTE
The patient has been examined and the H&P has been reviewed:    I concur with the findings and no changes have occurred since H&P was written.    Anesthesia/Surgery risks, benefits and alternative options discussed and understood by patient/family.          Active Hospital Problems    Diagnosis  POA    *Upper GI bleed [K92.2]  Yes    Chronic combined systolic and diastolic heart failure [I50.42]  Yes    Primary hypertension [I10]  Yes    History of total right hip replacement [Z96.641]  Not Applicable      Resolved Hospital Problems   No resolved problems to display.

## 2023-12-30 NOTE — TRANSFER OF CARE
"Anesthesia Transfer of Care Note    Patient: Ramesh Johnson Jr.    Procedure(s) Performed: Procedure(s) (LRB):  EGD (ESOPHAGOGASTRODUODENOSCOPY) (N/A)    Patient location: GI    Anesthesia Type: MAC    Transport from OR: Transported from OR on room air with adequate spontaneous ventilation    Post pain: adequate analgesia    Post assessment: no apparent anesthetic complications    Post vital signs: stable    Level of consciousness: awake, alert and oriented    Nausea/Vomiting: no nausea/vomiting    Complications: none    Transfer of care protocol was followed      Last vitals: Visit Vitals  BP (!) 140/73 (Patient Position: Lying)   Pulse 104   Temp 37.4 °C (99.3 °F) (Temporal)   Resp 16   Ht 5' 8" (1.727 m)   Wt 63.7 kg (140 lb 6.4 oz)   SpO2 (!) 92%   BMI 21.35 kg/m²     "

## 2023-12-30 NOTE — CONSULTS
Inpatient consult to Orthopedic Surgery  Consult performed by: Victor Manuel Barriga MD  Consult ordered by: Bj Hussein MD        Service Date: 12/30/2023  Chief complaint: postop R hip      HPI: 58M s/p R CON 12/3 for femoral neck fracture, admitted with GI bleed.  Hip has been doing well, denies fevers, chills, no drainage from wound.  Staples in place, pain moderate at the hip and knee.  Worse with motion, better with rest and immobilization.     Review of Systems  [unfilled]  chart review and the patient  Past Medical History:   Diagnosis Date    Anemia     Arthritis     CHF (congestive heart failure)     Encounter for blood transfusion     HTN (hypertension)     Paroxysmal SVT (supraventricular tachycardia)       Past Surgical History:   Procedure Laterality Date    COLONOSCOPY      ESOPHAGOGASTRODUODENOSCOPY      HERNIA REPAIR      TOTAL HIP ARTHROPLASTY Right       Social History     Socioeconomic History    Marital status:    Tobacco Use    Smoking status: Never    Smokeless tobacco: Current     Types: Snuff   Substance and Sexual Activity    Alcohol use: Yes    Drug use: Never    Sexual activity: Yes      Family History   Problem Relation Age of Onset    Diabetes Mother     Diabetes Father       Review of patient's allergies indicates:   Allergen Reactions    Milk containing products (dairy)       Prior to Admission medications    Medication Sig Start Date End Date Taking? Authorizing Provider   amlodipine (NORVASC) 5 MG tablet Take 1 tablet (5 mg total) by mouth once daily. 8/25/14 12/29/23 Yes Keturah Steve MD   aspirin (ECOTRIN) 81 MG EC tablet Take 81 mg by mouth once daily. 2/5/23  Yes Provider, Historical   diclofenac sodium (VOLTAREN) 1 % Gel Apply 4 g topically 4 (four) times daily. 5/9/23  Yes Provider, Historical   ferrous sulfate (FEOSOL) 325 mg (65 mg iron) Tab tablet Take 325 mg by mouth. 12/6/23 12/5/24 Yes Provider, Historical   furosemide (LASIX) 40 MG tablet Take 1  "tablet by mouth every morning. 2/5/23  Yes Provider, Historical   HYDROcodone-acetaminophen (NORCO) 5-325 mg per tablet Take 1 tablet by mouth every 4 (four) hours as needed for Pain. 3/29/22  Yes Herbert Brewer, NP   JARDIANCE 10 mg tablet Take 10 mg by mouth once daily. 2/5/23  Yes Provider, Historical   pantoprazole (PROTONIX) 40 MG tablet Take 40 mg by mouth once daily.   Yes Provider, Historical            VITAL SIGNS: 24 HR MIN & MAX LAST    Temp  Min: 97.7 °F (36.5 °C)  Max: 99.3 °F (37.4 °C)  97.9 °F (36.6 °C)        BP  Min: 139/73  Max: 197/95  (!) 162/81     Pulse  Min: 93  Max: 115  96     Resp  Min: 15  Max: 23  18    SpO2  Min: 92 %  Max: 100 %  96 %      HT: 5' 8" (172.7 cm)  WT: 63.7 kg (140 lb 6.4 oz)  BMI: 21.4     INTAKE & OUTPUT    Intake/Output Summary (Last 24 hours) at 12/30/2023 1100  Last data filed at 12/30/2023 0908  Gross per 24 hour   Intake 500 ml   Output 1000 ml   Net -500 ml        PHYSICAL EXAMINATION:  Physical Exam  NAD  A&O  R hip wound with staples in place, min if any swelling, staples in place, small eschar in center of wound, no drainage, min erythema around staples.  Distally SILT foot, baseline knee contracture.      LABS:  WBC 8.3, hct 26.5    IMAGING:  IMAGING PELVIS/HIP PENDING    IMPRESSION/PLAN  58M s/p R CON for femoral neck fracture, also with baseline R knee deformity, arthritic changes, admitted with GI bleed    Recommendations:    Activity:  WBAT, anterior hip precautions  Immobilization: none  Antibiotics: none from ortho standpoint  Diet: as floridalma from ortho standpoint  Analgesia: defer to primary team  Angicoagulation: defer to primary team given GI bleed, SCDs at the very least  Decision for surgery:  none at this time. Xrays of pelvis and hip ordered, plan to follow up with me in 6 weeks, repeat xrays pelvis and hip    Victor Manuel ANA Barriga.  Orthopaedic Surgery  [facility]    "

## 2023-12-30 NOTE — SUBJECTIVE & OBJECTIVE
Past Medical History:   Diagnosis Date    Anemia     Arthritis     CHF (congestive heart failure)     Encounter for blood transfusion     HTN (hypertension)     Paroxysmal SVT (supraventricular tachycardia)        Past Surgical History:   Procedure Laterality Date    COLONOSCOPY      ESOPHAGOGASTRODUODENOSCOPY      HERNIA REPAIR      TOTAL HIP ARTHROPLASTY Right        Review of patient's allergies indicates:  No Known Allergies    No current facility-administered medications on file prior to encounter.     Current Outpatient Medications on File Prior to Encounter   Medication Sig    amlodipine (NORVASC) 5 MG tablet Take 1 tablet (5 mg total) by mouth once daily.    HYDROcodone-acetaminophen (NORCO) 5-325 mg per tablet Take 1 tablet by mouth every 4 (four) hours as needed for Pain.     Family History       Problem Relation (Age of Onset)    Diabetes Mother, Father          Tobacco Use    Smoking status: Never    Smokeless tobacco: Current     Types: Snuff   Substance and Sexual Activity    Alcohol use: Yes    Drug use: Never    Sexual activity: Yes     Review of Systems   Gastrointestinal:  Positive for abdominal pain, nausea and vomiting.   All other systems reviewed and are negative.    Objective:     Vital Signs (Most Recent):  Temp: 98.6 °F (37 °C) (12/29/23 1549)  Pulse: 106 (12/29/23 1830)  Resp: 19 (12/29/23 1830)  BP: (!) 178/95 (12/29/23 1830)  SpO2: 99 % (12/29/23 1830) Vital Signs (24h Range):  Temp:  [98.6 °F (37 °C)] 98.6 °F (37 °C)  Pulse:  [100-106] 106  Resp:  [16-23] 19  SpO2:  [94 %-100 %] 99 %  BP: (172-197)/(76-97) 178/95     Weight: 63.7 kg (140 lb 6.4 oz)  Body mass index is 21.35 kg/m².     Physical Exam  HENT:      Head: Normocephalic and atraumatic.   Cardiovascular:      Rate and Rhythm: Normal rate and regular rhythm.      Heart sounds: No murmur heard.  Pulmonary:      Effort: Pulmonary effort is normal. No respiratory distress.      Breath sounds: Normal breath sounds. No wheezing.    Abdominal:      General: Bowel sounds are normal. There is no distension.      Palpations: Abdomen is soft.      Tenderness: There is no abdominal tenderness.   Musculoskeletal:         General: No swelling.   Skin:     General: Skin is warm and dry.      Comments: Right thigh incision with staples present. Eschar in the middle of incision   Neurological:      Mental Status: He is alert and oriented to person, place, and time. Mental status is at baseline.                Significant Labs: All pertinent labs within the past 24 hours have been reviewed.  CBC:   Recent Labs   Lab 12/29/23  1649   WBC 8.51   HGB 6.4*   HCT 22.6*        CMP:   Recent Labs   Lab 12/29/23  1649      K 4.3      CO2 21*   GLU 99   BUN 16   CREATININE 1.2   CALCIUM 8.4*   PROT 6.2   ALBUMIN 2.9*   BILITOT 0.6   ALKPHOS 143*   AST 22   ALT 15   ANIONGAP 10         Significant Imaging: I have reviewed all pertinent imaging results/findings within the past 24 hours.

## 2023-12-30 NOTE — ANESTHESIA PREPROCEDURE EVALUATION
12/30/2023  Ramesh Johnson Jr. is a 58 y.o., male.    Lab Results   Component Value Date    WBC 8.31 12/30/2023    HGB 7.5 (L) 12/30/2023    HCT 26.5 (L) 12/30/2023    MCV 78 (L) 12/30/2023     12/30/2023       BMP  Lab Results   Component Value Date     12/30/2023    K 4.4 12/30/2023     12/30/2023    CO2 19 (L) 12/30/2023    BUN 15 12/30/2023    CREATININE 1.2 12/30/2023    CALCIUM 8.4 (L) 12/30/2023    ANIONGAP 10 12/30/2023    EGFRNORACEVR >60 12/30/2023       Pre-op Assessment    I have reviewed the Patient Summary Reports.     I have reviewed the Nursing Notes. I have reviewed the NPO Status.   I have reviewed the Medications.     Review of Systems  Anesthesia Hx:             Denies Family Hx of Anesthesia complications.    Denies Personal Hx of Anesthesia complications.                    Social:  Non-Smoker, Alcohol Use       Hematology/Oncology:    Oncology Normal    -- Anemia:                                  EENT/Dental:  EENT/Dental Normal           Cardiovascular:     Hypertension       CHF        Paroxysmal SVT (supraventricular tachycardia)    CONCLUSIONS:   1. Moderately depressed left ventricular systolic function. LVEF 35 - 40%. Indeterminate   diastolic dysfunction. Normal wall motion.   2. Mildly dilated right ventricle. Moderate right ventricular hypokinesis.   3. Mild mitral valve regurgitation.   4. Severe tricuspid valve regurgitation. Systolic flow reversal noted in the hepatic vein   consistent with severe tricuspid regurgitation.           Congestive Heart Failure (CHF)                Hypertension         Pulmonary:  Pulmonary Normal                       Renal/:  Renal/ Normal                 Hepatic/GI:        Upper GI bleed          Musculoskeletal:  Arthritis               Neurological:  Neurology Normal                                       Endocrine:  Endocrine Normal            Dermatological:  Skin Normal    Psych:  Psychiatric Normal                    Physical Exam  General: Well nourished, Cooperative, Alert and Oriented    Airway:  Mallampati: II   Mouth Opening: Normal  TM Distance: Normal  Tongue: Normal  Neck ROM: Normal ROM        Anesthesia Plan  Type of Anesthesia, risks & benefits discussed:    Anesthesia Type: Gen ETT, MAC  Intra-op Monitoring Plan: Standard ASA Monitors  Induction:  IV  Airway Plan: Direct  Informed Consent: Informed consent signed with the Patient and all parties understand the risks and agree with anesthesia plan.  All questions answered. Patient consented to blood products? Yes  ASA Score: 3  Day of Surgery Review of History & Physical: H&P Update referred to the surgeon/provider.    Ready For Surgery From Anesthesia Perspective.     .

## 2023-12-30 NOTE — NURSING TRANSFER
Nursing Transfer Note      12/30/2023   10:55 AM    Nurse giving handoff:Layne  Nurse receiving handoff:Jeanine    Reason patient is being transferred: return to room    Transfer From: ENDO    Transfer via wheelchair    Transported by Endo staff X1    Telemetry: Box Number [id number], Rate 98, and Rhythm SR  Order for Tele Monitor? Yes    4eyes on Skin: yes    Chart send with patient: Yes

## 2023-12-30 NOTE — ANESTHESIA POSTPROCEDURE EVALUATION
Anesthesia Post Evaluation    Patient: Ramesh Johnson Jr.    Procedure(s) Performed: Procedure(s) (LRB):  EGD (ESOPHAGOGASTRODUODENOSCOPY) (N/A)    Final Anesthesia Type: MAC      Patient location during evaluation: GI PACU  Patient participation: Yes- Able to Participate  Level of consciousness: awake and alert and oriented  Post-procedure vital signs: reviewed and stable  Pain management: adequate  Airway patency: patent    PONV status at discharge: No PONV  Anesthetic complications: no      Cardiovascular status: blood pressure returned to baseline, stable and hemodynamically stable  Respiratory status: unassisted, room air and spontaneous ventilation  Hydration status: euvolemic  Follow-up not needed.              Vitals Value Taken Time   /73 12/30/23 0918   Temp 98 12/30/23 0922   Pulse 102 12/30/23 0918   Resp 16 12/30/23 0918   SpO2 93 % 12/30/23 0918         No case tracking events are documented in the log.      Pain/Neela Score: Pain Rating Prior to Med Admin: 10 (12/30/2023  8:12 AM)  Pain Rating Post Med Admin: 0 (12/30/2023  1:07 AM)  Neela Score: 10 (12/30/2023  9:18 AM)

## 2023-12-30 NOTE — ASSESSMENT & PLAN NOTE
-NPO  -IV PPI  -transfuse unit of blood  -GI consulted and plan EGD in am  -serial monitoring of H/H  -plan resume carafate tomorrow

## 2023-12-30 NOTE — PROVATION PATIENT INSTRUCTIONS
Discharge Summary/Instructions after an Endoscopic Procedure  Patient Name: Ramesh Johnson  Patient MRN: [id number]  Patient YOB: 1965 Saturday, December 30, 2023 Obie Oakley MD  Dear patient,  As a result of recent federal legislation (The Federal Cures Act), you may   receive lab or pathology results from your procedure in your [facility]   account before your physician is able to contact you. Your physician or   their representative will relay the results to you with their   recommendations at their soonest availability.  Thank you,  RESTRICTIONS:  During your procedure today, you received medications for sedation.  These   medications may affect your judgment, balance and coordination.  Therefore,   for 24 hours, you have the following restrictions:   - DO NOT drive a car, operate machinery, make legal/financial decisions,   sign important papers or drink alcohol.    ACTIVITY:  Today: no heavy lifting, straining or running due to procedural   sedation/anesthesia.  The following day: return to full activity including work.  DIET:  Eat and drink normally unless instructed otherwise.     TREATMENT FOR COMMON SIDE EFFECTS:  - Mild abdominal pain, nausea, belching, bloating or excessive gas:  rest,   eat lightly and use a heating pad.  - Sore Throat: treat with throat lozenges and/or gargle with warm salt   water.  - Because air was used during the procedure, expelling large amounts of air   from your rectum or belching is normal.  - If a bowel prep was taken, you may not have a bowel movement for 1-3 days.    This is normal.  SYMPTOMS TO WATCH FOR AND REPORT TO YOUR PHYSICIAN:  1. Abdominal pain or bloating, other than gas cramps.  2. Chest pain.  3. Back pain.  4. Signs of infection such as: chills or fever occurring within 24 hours   after the procedure.  5. Rectal bleeding, which would show as bright red, maroon, or black stools.   (A tablespoon of blood from the rectum is not serious, especially  if   hemorrhoids are present.)  6. Vomiting.  7. Weakness or dizziness.  GO DIRECTLY TO THE NEAREST EMERGENCY ROOM IF YOU HAVE ANY OF THE FOLLOWING:      Difficulty breathing              Chills and/or fever over 101 F   Persistent vomiting and/or vomiting blood   Severe abdominal pain   Severe chest pain   Black, tarry stools   Bleeding- more than one tablespoon   Any other symptom or condition that you feel may need urgent attention  Your doctor recommends these additional instructions:  If any biopsies were taken, your doctors clinic will contact you in 1 to 2   weeks with any results.  - [facility] to [facility].   - Resume previous diet.   - Continue present medications.   - Repeat upper endoscopy in 3 months to evaluate the response to therapy.   - Return to primary care physician as previously scheduled.   - No aspirin, ibuprofen, naproxen, or other non-steroidal anti-inflammatory   drugs for 12 weeks.  For questions, problems or results please call your physician Obie Oakley MD at Work:  [phone]  If you have any questions about the above instructions, call the GI   department at [phone] or call the endoscopy unit at [phone]   from 7am until 3 pm.  [facility] [place], EMERGENCY ROOM PHONE NUMBER:   [phone]  IF A COMPLICATION OR EMERGENCY SITUATION ARISES AND YOU ARE UNABLE TO REACH   YOUR PHYSICIAN - GO DIRECTLY TO THE EMERGENCY ROOM.  I have read or have had read to me these discharge instructions for my   procedure and have received a written copy.  I understand these   instructions and will follow-up with my physician if I have any questions.     __________________________________       _____________________________________  Nurse Signature                                          Patient/Designated   Responsible Party Signature  MD Obie Jacinto MD  12/30/2023 9:05:52 AM  This report has been  verified and signed electronically.  Dear patient,  As a result of recent federal legislation (The Federal Cures Act), you may   receive lab or pathology results from your procedure in your [facility]   account before your physician is able to contact you. Your physician or   their representative will relay the results to you with their   recommendations at their soonest availability.  Thank you,  PROVATION

## 2023-12-30 NOTE — CONSULTS
[facility] - Med Surg 3  Gastroenterology  Consult Note    Patient Name: Ramesh Johnson Jr.  MRN: [id number]  Admission Date: 12/29/2023  Hospital Length of Stay: 1 days  Code Status: Full Code   Attending Provider: Bj Hussein MD   Consulting Provider: Obie Oakley MD  Primary Care Physician: Tony Mckinney APRN  Principal Problem:Upper GI bleed    Inpatient consult to Gastroenterology  Consult performed by: Obie Oakley MD  Consult ordered by: Bj Hussein MD        Subjective:     HPI: The patient is a 59 yo male with past medical history of hypertension, PUD, extensive NSAID use, right femur fracture s/p CON, Combined heart failure and SVT an d chronic liver disease admitted with melena. Patient had similar episode last year when he was admitted at [facility] [place] and has EGD and colonoscopy. Patient does not know details but was advised was capsule endoscopy. However patient never got scheduled    He reports he recently had right hip surgery. He has been taking aspirin and ibuprofen. He was found to have a hemoglobin of 6.4. GI and hospital medicine consulted. Blood transfusion ordered in the ED.     Past Medical History:   Diagnosis Date    Anemia     Arthritis     CHF (congestive heart failure)     Encounter for blood transfusion     HTN (hypertension)     Paroxysmal SVT (supraventricular tachycardia)        Past Surgical History:   Procedure Laterality Date    COLONOSCOPY      ESOPHAGOGASTRODUODENOSCOPY      HERNIA REPAIR      TOTAL HIP ARTHROPLASTY Right        Review of patient's allergies indicates:   Allergen Reactions    Milk containing products (dairy)      Family History       Problem Relation (Age of Onset)    Diabetes Mother, Father          Tobacco Use    Smoking status: Never    Smokeless tobacco: Current     Types: Snuff   Substance and Sexual Activity    Alcohol use: Yes    Drug use: Never    Sexual activity: Yes     Review of Systems  Gastrointestinal:  Positive for  abdominal pain, nausea and vomiting.   All other systems reviewed and are negative.  Objective:     Vital Signs (Most Recent):  Temp: 99.3 °F (37.4 °C) (12/30/23 0845)  Pulse: 97 (12/30/23 0806)  Resp: 17 (12/30/23 0812)  BP: (!) 158/83 (12/30/23 0806)  SpO2: 97 % (12/30/23 0806) Vital Signs (24h Range):  Temp:  [97.7 °F (36.5 °C)-99.3 °F (37.4 °C)] 99.3 °F (37.4 °C)  Pulse:  [] 97  Resp:  [15-23] 17  SpO2:  [94 %-100 %] 97 %  BP: (147-197)/(76-97) 158/83     Weight: 63.7 kg (140 lb 6.4 oz) (12/29/23 1650)  Body mass index is 21.35 kg/m².      Intake/Output Summary (Last 24 hours) at 12/30/2023 0848  Last data filed at 12/30/2023 0606  Gross per 24 hour   Intake 300 ml   Output 1000 ml   Net -700 ml       Lines/Drains/Airways       Airway  Duration                  Airway - Non-Surgical 12/30/23 0818 Nasal Cannula <1 day              Peripheral Intravenous Line  Duration                  Peripheral IV - Single Lumen 12/29/23 1630 20 G Left Forearm <1 day         Peripheral IV - Single Lumen 12/29/23 1746 18 G Right Antecubital <1 day                    Physical Exam  Constitutional:       Appearance: Normal appearance.   HENT:      Head: Normocephalic and atraumatic.      Right Ear: External ear normal.      Left Ear: External ear normal.      Mouth/Throat:      Mouth: Mucous membranes are moist.   Eyes:      Extraocular Movements: Extraocular movements intact.      Pupils: Pupils are equal, round, and reactive to light.   Cardiovascular:      Rate and Rhythm: Normal rate and regular rhythm.      Pulses: Normal pulses.      Heart sounds: Normal heart sounds.   Pulmonary:      Effort: Pulmonary effort is normal.      Breath sounds: Normal breath sounds.   Abdominal:      General: Bowel sounds are normal. There is no distension.      Palpations: Abdomen is soft. There is no mass.      Tenderness: There is no abdominal tenderness.   Musculoskeletal:         General: Normal range of motion.      Cervical back:  "Normal range of motion and neck supple.   Neurological:      General: No focal deficit present.      Mental Status: He is alert and oriented to person, place, and time.         Significant Labs:  CBC:   Recent Labs   Lab 12/29/23  1649 12/30/23  0408 12/30/23  0802   WBC 8.51 8.31 8.15   HGB 6.4* 7.5* 7.4*   HCT 22.6* 26.5* 25.5*    277 256     CMP:   Recent Labs   Lab 12/30/23  0408   GLU 68*   CALCIUM 8.4*   ALBUMIN 2.8*   PROT 6.1      K 4.4   CO2 19*      BUN 15   CREATININE 1.2   ALKPHOS 133   ALT 13   AST 23   BILITOT 0.9     Coagulation: No results for input(s): "PT", "INR", "APTT" in the last 48 hours.      Assessment/Plan:     Active Diagnoses:    Diagnosis Date Noted POA    PRINCIPAL PROBLEM:  Upper GI bleed [K92.2] 12/29/2023 Yes    Chronic combined systolic and diastolic heart failure [I50.42] 12/29/2023 Yes    Primary hypertension [I10] 12/29/2023 Yes    History of total right hip replacement [Z96.641] 12/29/2023 Not Applicable      Problems Resolved During this Admission:     Melena  -Likely NSAIDs related  -Continue Protonix  -Monitor HnH  -Transfuse to keep Hb >7.  -Plan EGD    Chronic combined systolic and diastolic heart failure  -no signs of exacerbation  -monitor     Thank you for your consult. I will follow-up with patient. Please contact us if you have any additional questions.    Obie Oakley MD  Gastroenterology  [facility] - Med S[facility]    "

## 2023-12-30 NOTE — HPI
The patient is a 59 yo male with past medical history of hypertension, PUD, extensive NSAID use, right femur fracture s/p CON, Combined heart failure and SVT who presented to the ED with epigastric abdominal pain and melena for the last couple of days. He reports he recently had right hip surgery. He has been taking aspirin and ibuprofen.  He usually takes carafate but has been out. He has a burning pain in the epigastric area. Nothing has helped the pain. He came to the ED for further evaluation. He was found to have a hemoglobin of 6.4. GI and hospital medicine consulted. Blood transfusion ordered in the ED.

## 2023-12-30 NOTE — H&P
[facility] - Emergency Dept.  [facility] Medicine  History & Physical    Patient Name: Ramesh Johnson Jr.  MRN: [id number]  Patient Class: OP- Observation  Admission Date: 12/29/2023  Attending Physician: Bj Hussein MD   Primary Care Provider: Tony Mckinney APRN         Patient information was obtained from patient, past medical records, and ER records.     Subjective:     Principal Problem:Upper GI bleed    Chief Complaint:   Chief Complaint   Patient presents with    Abdominal Pain     Pt. Has been out of his ulcer meds for the past week. He is having black stool, epigastric pain. He is also having hip pain from a recent hip surgery.         HPI: The patient is a 57 yo male with past medical history of hypertension, PUD, extensive NSAID use, right femur fracture s/p CON, Combined heart failure and SVT who presented to the ED with epigastric abdominal pain and melena for the last couple of days. He reports he recently had right hip surgery. He has been taking aspirin and ibuprofen.  He usually takes carafate but has been out. He has a burning pain in the epigastric area. Nothing has helped the pain. He came to the ED for further evaluation. He was found to have a hemoglobin of 6.4. GI and hospital medicine consulted. Blood transfusion ordered in the ED.     Past Medical History:   Diagnosis Date    Anemia     Arthritis     CHF (congestive heart failure)     Encounter for blood transfusion     HTN (hypertension)     Paroxysmal SVT (supraventricular tachycardia)        Past Surgical History:   Procedure Laterality Date    COLONOSCOPY      ESOPHAGOGASTRODUODENOSCOPY      HERNIA REPAIR      TOTAL HIP ARTHROPLASTY Right        Review of patient's allergies indicates:  No Known Allergies    No current facility-administered medications on file prior to encounter.     Current Outpatient Medications on File Prior to Encounter   Medication Sig    amlodipine (NORVASC) 5 MG tablet Take 1 tablet (5 mg total) by mouth  once daily.    HYDROcodone-acetaminophen (NORCO) 5-325 mg per tablet Take 1 tablet by mouth every 4 (four) hours as needed for Pain.     Family History       Problem Relation (Age of Onset)    Diabetes Mother, Father          Tobacco Use    Smoking status: Never    Smokeless tobacco: Current     Types: Snuff   Substance and Sexual Activity    Alcohol use: Yes    Drug use: Never    Sexual activity: Yes     Review of Systems   Gastrointestinal:  Positive for abdominal pain, nausea and vomiting.   All other systems reviewed and are negative.    Objective:     Vital Signs (Most Recent):  Temp: 98.6 °F (37 °C) (12/29/23 1549)  Pulse: 106 (12/29/23 1830)  Resp: 19 (12/29/23 1830)  BP: (!) 178/95 (12/29/23 1830)  SpO2: 99 % (12/29/23 1830) Vital Signs (24h Range):  Temp:  [98.6 °F (37 °C)] 98.6 °F (37 °C)  Pulse:  [100-106] 106  Resp:  [16-23] 19  SpO2:  [94 %-100 %] 99 %  BP: (172-197)/(76-97) 178/95     Weight: 63.7 kg (140 lb 6.4 oz)  Body mass index is 21.35 kg/m².     Physical Exam  HENT:      Head: Normocephalic and atraumatic.   Cardiovascular:      Rate and Rhythm: Normal rate and regular rhythm.      Heart sounds: No murmur heard.  Pulmonary:      Effort: Pulmonary effort is normal. No respiratory distress.      Breath sounds: Normal breath sounds. No wheezing.   Abdominal:      General: Bowel sounds are normal. There is no distension.      Palpations: Abdomen is soft.      Tenderness: There is no abdominal tenderness.   Musculoskeletal:         General: No swelling.   Skin:     General: Skin is warm and dry.      Comments: Right thigh incision with staples present. Eschar in the middle of incision   Neurological:      Mental Status: He is alert and oriented to person, place, and time. Mental status is at baseline.                Significant Labs: All pertinent labs within the past 24 hours have been reviewed.  CBC:   Recent Labs   Lab 12/29/23  1649   WBC 8.51   HGB 6.4*   HCT 22.6*        CMP:   Recent  Labs   Lab 12/29/23  1649      K 4.3      CO2 21*   GLU 99   BUN 16   CREATININE 1.2   CALCIUM 8.4*   PROT 6.2   ALBUMIN 2.9*   BILITOT 0.6   ALKPHOS 143*   AST 22   ALT 15   ANIONGAP 10         Significant Imaging: I have reviewed all pertinent imaging results/findings within the past 24 hours.  Assessment/Plan:     * Upper GI bleed  -NPO  -IV PPI  -transfuse unit of blood  -GI consulted and plan EGD in am  -serial monitoring of H/H  -plan resume carafate tomorrow      History of total right hip replacement  12/3 with Dr. Barriga  -will consult in am to assess incision site      Primary hypertension  -hold oral medication  -prn IV labetalol  -monitor blood pressure    Chronic combined systolic and diastolic heart failure  -no signs of exacerbation  -monitor   -may need dose of lasix after blood transfusion        VTE Risk Mitigation (From admission, onward)           Ordered     IP VTE LOW RISK PATIENT  Once         12/29/23 1842     Place sequential compression device  Until discontinued         12/29/23 1842                       On 12/29/2023, patient should be placed in hospital observation services under my care.             Bj Hussein MD  Department of Hospital Medicine  [facility] - Emergency Dept.

## 2023-12-30 NOTE — PHARMACY MED REC
"Admission Medication History     The home medication history was taken by Faviola Mills.    You may go to "Admission" then "Reconcile Home Medications" tabs to review and/or act upon these items.     The home medication list has been updated by the Pharmacy department.   Please read ALL comments highlighted in yellow.   Please address this information as you see fit.    Feel free to contact us if you have any questions or require assistance.        Medications listed below were obtained from: Patient/family and Analytic software- Recommind,a few medication bottles were brought in  (Not in a hospital admission)      [facility] REC COMPLETED:     Faviolagadiel Mills  [id number]      Current Outpatient Medications on File Prior to Encounter   Medication Sig Dispense Refill Last Dose    amlodipine (NORVASC) 5 MG tablet Take 1 tablet (5 mg total) by mouth once daily. 30 tablet 2 Past Week    aspirin (ECOTRIN) 81 MG EC tablet Take 81 mg by mouth once daily.   Past Week    diclofenac sodium (VOLTAREN) 1 % Gel Apply 4 g topically 4 (four) times daily.   Past Week    ferrous sulfate (FEOSOL) 325 mg (65 mg iron) Tab tablet Take 325 mg by mouth.   Past Week    furosemide (LASIX) 40 MG tablet Take 1 tablet by mouth every morning.   Past Week    HYDROcodone-acetaminophen (NORCO) 5-325 mg per tablet Take 1 tablet by mouth every 4 (four) hours as needed for Pain. 18 tablet 0 Past Week    JARDIANCE 10 mg tablet Take 10 mg by mouth once daily.   Past Week    pantoprazole (PROTONIX) 40 MG tablet Take 40 mg by mouth once daily.   Past Week                           .          "

## 2023-12-31 PROBLEM — R10.13 ACUTE EPIGASTRIC PAIN: Status: ACTIVE | Noted: 2023-01-01

## 2023-12-31 PROBLEM — D62 ACUTE BLOOD LOSS ANEMIA: Status: ACTIVE | Noted: 2023-12-31

## 2023-12-31 NOTE — DISCHARGE SUMMARY
[facility] - Med Surg 3  [facility] Medicine  Discharge Summary      Patient Name: Ramesh Johnson Jr.  MRN: [id number]  [facility]: [id number]  Patient Class: IP- Inpatient  Admission Date: 12/29/2023  Hospital Length of Stay: 2 days  Discharge Date and Time: 12/31/2023 1740  Attending Physician: Bj Hussein MD   Discharging Provider: Bj Hussein MD  Primary Care Provider: Tony Mckinney APRN    Primary Care Team: Networked reference to record PCT     HPI:   The patient is a 59 yo male with past medical history of hypertension, PUD, extensive NSAID use, right femur fracture s/p CON, Combined heart failure and SVT who presented to the ED with epigastric abdominal pain and melena for the last couple of days. He reports he recently had right hip surgery. He has been taking aspirin and ibuprofen.  He usually takes carafate but has been out. He has a burning pain in the epigastric area. Nothing has helped the pain. He came to the ED for further evaluation. He was found to have a hemoglobin of 6.4. GI and hospital medicine consulted. Blood transfusion ordered in the ED.     Procedure(s) (LRB):  EGD (ESOPHAGOGASTRODUODENOSCOPY) (N/A)      Hospital Course:   The patient presented with epigastric pain with melena. He reports he was out of some ulcer medication and was on aspirin and ibuprofen. He was noted to have hemoglobin of 6.4.He was placed on IV PPI and transfused 1 unit of blood. GI was consulted and patient underwent EGD. Bleeding was noted and treated with Epi. GI okay with diet. Clears initiated and advanced as tolerated to cardiac. His hemoglobin was stable. He was discharged with Protonix and carafate. Outpatient f/u with GI and PCP.    Right femur fracture: S/p right CON 12/3, WBAT. Had some eschar at incision site. Laure removed by Dr. Barriga 12/30. Not given DVT prophylaxis as risks outweighs the benefit. Patient has had 2 EGDs since hip surgery and been transfused prior to both EGD with the last one  revealing active bleeding treated with Epi.     Chronic combined heart failure: patient was euvolemic at time of discharge. Patient seen and examined, stable for discharge.     Goals of Care Treatment Preferences:  Code Status: Full Code      Consults:   Consults (From admission, onward)          Status Ordering Provider     Inpatient consult to Orthopedic Surgery  Once        Provider:  Victor Manuel Barriga MD    Completed JORDANA LARA     Inpatient consult to Gastroenterology  Once        Provider:  (Not yet assigned)    Completed JORDANA LARA                Final Active Diagnoses:    Diagnosis Date Noted POA    PRINCIPAL PROBLEM:  Acute upper GI bleed [K92.2] 12/29/2023 Yes    Acute blood loss anemia [D62] 12/31/2023 Unknown    Acute epigastric pain [R10.13] 12/31/2023 Unknown    Chronic combined systolic and diastolic heart failure [I50.42] 12/29/2023 Yes    Primary hypertension [I10] 12/29/2023 Yes    History of total right hip replacement [Z96.641] 12/29/2023 Not Applicable      Problems Resolved During this Admission:       Discharged Condition: stable    Disposition: Home or Self Care    Follow Up:   Follow-up Information       Tony Mckinney APRN Follow up.    Specialty: Family Medicine  Contact information:  [address]  [address]  [place] [id number]  [phone]               [facility] .    Contact information:  [id number]                         Patient Instructions:      Ambulatory referral/consult to Gastroenterology   Standing Status: Future   Referral Priority: Routine Referral Type: Consultation   Referral Reason: Specialty Services Required Referral Location: [facility]   Requested Specialty: Gastroenterology   Number of Visits Requested: 1     Diet Cardiac     Activity as tolerated       Significant Diagnostic Studies: Endoscopy: EGD: severe esophagitis with active bleeding, treated with Epi    Pending Diagnostic Studies:        None           Medications:  Reconciled Home Medications:      Medication List        START taking these medications      benzonatate 100 MG capsule  Commonly known as: TESSALON  Take 1 capsule (100 mg total) by mouth 3 (three) times daily as needed for Cough.     sucralfate 100 mg/mL suspension  Commonly known as: CARAFATE  Take 10 mLs (1 g total) by mouth every 6 (six) hours.  Start taking on: January 1, 2024            CHANGE how you take these medications      pantoprazole 40 MG tablet  Commonly known as: PROTONIX  Take 1 tablet (40 mg total) by mouth 2 (two) times daily.  What changed: when to take this            CONTINUE taking these medications      amLODIPine 5 MG tablet  Commonly known as: NORVASC  Take 1 tablet (5 mg total) by mouth once daily.     diclofenac sodium 1 % Gel  Commonly known as: VOLTAREN  Apply 4 g topically 4 (four) times daily.     ferrous sulfate 325 mg (65 mg iron) Tab tablet  Commonly known as: FEOSOL  Take 325 mg by mouth.     furosemide 40 MG tablet  Commonly known as: LASIX  Take 1 tablet by mouth every morning.     HYDROcodone-acetaminophen 5-325 mg per tablet  Commonly known as: NORCO  Take 1 tablet by mouth every 4 (four) hours as needed for Pain.     JARDIANCE 10 mg tablet  Generic drug: empagliflozin  Take 10 mg by mouth once daily.            STOP taking these medications      aspirin 81 MG EC tablet  Commonly known as: ECOTRIN              Indwelling Lines/Drains at time of discharge:   Lines/Drains/Airways       None                   Time spent on the discharge of patient: 31 minutes         Bj Hussein MD  Department of Hospital Medicine  [facility] - Med Surg 3

## 2023-12-31 NOTE — PLAN OF CARE
[facility] - Med Surg 3  Initial Discharge Assessment       Primary Care Provider: Tony Mckinney APRN    Admission Diagnosis: Melena [K92.1]  Peptic ulcer symptoms [R19.8]  Acute upper GI bleed [K92.2]  Acute epigastric pain [R10.13]  Chest pain [R07.9]    Admission Date: 12/29/2023  Expected Discharge Date:     Transition of Care Barriers: None    Payor: MEDICAID / Plan: [facility] [employer] [facility] ([place] MEDICAID) / Product Type: Managed Medicaid /     Extended Emergency Contact Information  Primary Emergency Contact: Krupa Johnson   [facility]  Home Phone: [phone]  Mobile Phone: [phone]  Relation: Spouse  Secondary Emergency Contact: Glen Osullivan  Mobile Phone: [phone]  Relation: Friend    Discharge Plan A: Home       No Pharmacies Listed    Initial Assessment (most recent)       Adult Discharge Assessment - 12/31/23 0853          Discharge Assessment    Assessment Type Discharge Planning Assessment     Confirmed/corrected address, phone number and insurance Yes     Confirmed Demographics Correct on Facesheet     Source of Information patient     Does patient/caregiver understand observation status Yes     Communicated ALVINA with patient/caregiver Date not available/Unable to determine     People in Home spouse     Do you expect to return to your current living situation? Yes     Do you have help at home or someone to help you manage your care at home? No     Prior to hospitilization cognitive status: Alert/Oriented     Current cognitive status: Alert/Oriented     Walking or Climbing Stairs Difficulty no     Dressing/Bathing Difficulty no     Equipment Currently Used at Home crutches;wheelchair     Readmission within 30 days? No     Patient currently being followed by outpatient case management? No     Do you currently have service(s) that help you manage your care at home? No     Do you take prescription medications? Yes     Do you have prescription coverage? Yes     Do you have any  problems affording any of your prescribed medications? No     Is the patient taking medications as prescribed? yes     Who is going to help you get home at discharge? family     How do you get to doctors appointments? family or friend will provide     Are you on dialysis? No     Do you take coumadin? No     Discharge Plan A Home     DME Needed Upon Discharge  none     Discharge Plan discussed with: Patient     Transition of Care Barriers None

## 2023-12-31 NOTE — PROGRESS NOTES
Discharge education and instructions reviewed with patient. Questions answered as of now, LDA's and Telemetry monitor removed per provider order. Patient remained free from falls during shift. Discharge medications sent to [facility] in [facility]. Patient discharged with personal belongings via wheel chair.

## 2023-12-31 NOTE — SUBJECTIVE & OBJECTIVE
Interval History: patient tolerated transfusion, no complaints    Review of Systems  Objective:     Vital Signs (Most Recent):  Temp: 98.2 °F (36.8 °C) (12/30/23 1545)  Pulse: 89 (12/30/23 1545)  Resp: 18 (12/30/23 1545)  BP: (!) 151/78 (12/30/23 1545)  SpO2: 98 % (12/30/23 1545) Vital Signs (24h Range):  Temp:  [97.7 °F (36.5 °C)-99.3 °F (37.4 °C)] 98.2 °F (36.8 °C)  Pulse:  [] 89  Resp:  [15-19] 18  SpO2:  [92 %-100 %] 98 %  BP: (139-178)/(72-95) 151/78     Weight: 63.7 kg (140 lb 6.4 oz)  Body mass index is 21.35 kg/m².    Intake/Output Summary (Last 24 hours) at 12/30/2023 1804  Last data filed at 12/30/2023 0908  Gross per 24 hour   Intake 500 ml   Output 1000 ml   Net -500 ml         Physical Exam  HENT:      Head: Normocephalic and atraumatic.   Cardiovascular:      Rate and Rhythm: Normal rate and regular rhythm.      Heart sounds: No murmur heard.  Pulmonary:      Effort: Pulmonary effort is normal. No respiratory distress.      Breath sounds: Normal breath sounds. No wheezing.   Abdominal:      General: Bowel sounds are normal. There is no distension.      Palpations: Abdomen is soft.      Tenderness: There is no abdominal tenderness.   Musculoskeletal:         General: No swelling.   Skin:     General: Skin is warm and dry.   Neurological:      Mental Status: He is alert and oriented to person, place, and time. Mental status is at baseline.             Significant Labs: All pertinent labs within the past 24 hours have been reviewed.  CBC:   Recent Labs   Lab 12/30/23  0408 12/30/23  0802 12/30/23  1259   WBC 8.31 8.15 8.69   HGB 7.5* 7.4* 7.9*   HCT 26.5* 25.5* 27.3*    256 274     CMP:   Recent Labs   Lab 12/29/23  1649 12/30/23  0408    137   K 4.3 4.4    108   CO2 21* 19*   GLU 99 68*   BUN 16 15   CREATININE 1.2 1.2   CALCIUM 8.4* 8.4*   PROT 6.2 6.1   ALBUMIN 2.9* 2.8*   BILITOT 0.6 0.9   ALKPHOS 143* 133   AST 22 23   ALT 15 13   ANIONGAP 10 10       Significant Imaging: I  have reviewed all pertinent imaging results/findings within the past 24 hours.

## 2023-12-31 NOTE — NURSING
Received lying in bed awake and alert, resp even and unlabored, assessment per flowsheet, denies any pain or discomfort at this time. Right hip incision with steri-strips intact. Plan of care discussed, pt verbalized understanding. Will continue to monitor.

## 2023-12-31 NOTE — PROGRESS NOTES
[facility] - Med Surg 3  [facility] Medicine  Progress Note    Patient Name: Ramesh Johnson Jr.  MRN: [id number]  Patient Class: IP- Inpatient   Admission Date: 12/29/2023  Length of Stay: 1 days  Attending Physician: Bj Hussein MD  Primary Care Provider: Tony Mckinney APRN        Subjective:     Principal Problem:Upper GI bleed        HPI:  The patient is a 57 yo male with past medical history of hypertension, PUD, extensive NSAID use, right femur fracture s/p CON, Combined heart failure and SVT who presented to the ED with epigastric abdominal pain and melena for the last couple of days. He reports he recently had right hip surgery. He has been taking aspirin and ibuprofen.  He usually takes carafate but has been out. He has a burning pain in the epigastric area. Nothing has helped the pain. He came to the ED for further evaluation. He was found to have a hemoglobin of 6.4. GI and hospital medicine consulted. Blood transfusion ordered in the ED.     Overview/Hospital Course:  The patient presented with epigastric pain with melena. He reports he was out of some ulcer medication and was on aspirin and ibuprofen. He was noted to have hemoglobin of 6.4.He was placed on IV PPI and transfused 1 unit of blood. GI was consulted and patient underwent EGD. Bleeding was noted and treated with Epi. GI okay with diet. Clears with advance as tolerated.    Interval History: patient tolerated transfusion, no complaints    Review of Systems  Objective:     Vital Signs (Most Recent):  Temp: 98.2 °F (36.8 °C) (12/30/23 1545)  Pulse: 89 (12/30/23 1545)  Resp: 18 (12/30/23 1545)  BP: (!) 151/78 (12/30/23 1545)  SpO2: 98 % (12/30/23 1545) Vital Signs (24h Range):  Temp:  [97.7 °F (36.5 °C)-99.3 °F (37.4 °C)] 98.2 °F (36.8 °C)  Pulse:  [] 89  Resp:  [15-19] 18  SpO2:  [92 %-100 %] 98 %  BP: (139-178)/(72-95) 151/78     Weight: 63.7 kg (140 lb 6.4 oz)  Body mass index is 21.35 kg/m².    Intake/Output Summary (Last 24  hours) at 12/30/2023 1804  Last data filed at 12/30/2023 0908  Gross per 24 hour   Intake 500 ml   Output 1000 ml   Net -500 ml         Physical Exam  HENT:      Head: Normocephalic and atraumatic.   Cardiovascular:      Rate and Rhythm: Normal rate and regular rhythm.      Heart sounds: No murmur heard.  Pulmonary:      Effort: Pulmonary effort is normal. No respiratory distress.      Breath sounds: Normal breath sounds. No wheezing.   Abdominal:      General: Bowel sounds are normal. There is no distension.      Palpations: Abdomen is soft.      Tenderness: There is no abdominal tenderness.   Musculoskeletal:         General: No swelling.   Skin:     General: Skin is warm and dry.   Neurological:      Mental Status: He is alert and oriented to person, place, and time. Mental status is at baseline.             Significant Labs: All pertinent labs within the past 24 hours have been reviewed.  CBC:   Recent Labs   Lab 12/30/23  0408 12/30/23  0802 12/30/23  1259   WBC 8.31 8.15 8.69   HGB 7.5* 7.4* 7.9*   HCT 26.5* 25.5* 27.3*    256 274     CMP:   Recent Labs   Lab 12/29/23  1649 12/30/23  0408    137   K 4.3 4.4    108   CO2 21* 19*   GLU 99 68*   BUN 16 15   CREATININE 1.2 1.2   CALCIUM 8.4* 8.4*   PROT 6.2 6.1   ALBUMIN 2.9* 2.8*   BILITOT 0.6 0.9   ALKPHOS 143* 133   AST 22 23   ALT 15 13   ANIONGAP 10 10       Significant Imaging: I have reviewed all pertinent imaging results/findings within the past 24 hours.    Assessment/Plan:      * Upper GI bleed  -appreciate GI  -s/p EGD: severe esophagitis with bleeding, treated with epi  -continue IV PPI  -resumed carafate  -transfused unit of blood  -serial monitoring of H/H        History of total right hip replacement  12/3 with Dr. Barriga  -appreciate Dr. Barriga  -WBAT  -f/u in 6 weeks      Primary hypertension  -resume amlodipine  -prn IV labetalol  -monitor blood pressure    Chronic combined systolic and diastolic heart failure  -no signs of  exacerbation  -monitor           VTE Risk Mitigation (From admission, onward)           Ordered     IP VTE LOW RISK PATIENT  Once         12/29/23 1842     Place sequential compression device  Until discontinued         12/29/23 1842                    Discharge Planning   ALVINA:      Code Status: Full Code   Is the patient medically ready for discharge?:     Reason for patient still in hospital (select all that apply): Patient trending condition, Laboratory test, Imaging, and Consult recommendations                     Bj Hussein MD  Department of Hospital Medicine   [facility] - Med Surg 3

## 2023-12-31 NOTE — PROGRESS NOTES
[facility] - Med [facility] 3  Gastroenterology  Progress Note    Patient Name: Ramesh Johnson Jr.  MRN: [id number]  Admission Date: 12/29/2023  Hospital Length of Stay: 2 days  Code Status: Full Code   Attending Provider: Bj Hussein MD   Consulting Provider: Obie Oakley MD  Primary Care Physician: Tony Mckinney APRN  Principal Problem:Upper GI bleed    Consults  Subjective:     HPI: The patient is a 57 yo male with past medical history of hypertension, PUD, extensive NSAID use, right femur fracture s/p CON, Combined heart failure and SVT an d chronic liver disease admitted with melena. Patient had similar episode last year when he was admitted at [facility] and has EGD and colonoscopy. Patient does not know details but was advised was capsule endoscopy. However patient never got scheduled    He reports he recently had right hip surgery. He has been taking aspirin and ibuprofen. He was found to have a hemoglobin of 6.4. GI and hospital medicine consulted. Blood transfusion ordered in the ED.       Interval History: EGD--severe esophagitis  No events overnight. HnH stable    Past Medical History:   Diagnosis Date    Anemia     Arthritis     CHF (congestive heart failure)     Encounter for blood transfusion     HTN (hypertension)     Paroxysmal SVT (supraventricular tachycardia)        Past Surgical History:   Procedure Laterality Date    COLONOSCOPY      ESOPHAGOGASTRODUODENOSCOPY      ESOPHAGOGASTRODUODENOSCOPY N/A 12/30/2023    Procedure: EGD (ESOPHAGOGASTRODUODENOSCOPY);  Surgeon: Obie Oakley MD;  Location: [facility];  Service: Endoscopy;  Laterality: N/A;    HERNIA REPAIR      TOTAL HIP ARTHROPLASTY Right        Review of patient's allergies indicates:   Allergen Reactions    Milk containing products (dairy)      Family History       Problem Relation (Age of Onset)    Diabetes Mother, Father          Tobacco Use    Smoking status: Never    Smokeless tobacco: Current     Types: Snuff   Substance  and Sexual Activity    Alcohol use: Yes    Drug use: Never    Sexual activity: Yes     Review of Systems  Gastrointestinal:  Positive for abdominal pain, nausea and vomiting.   All other systems reviewed and are negative.  Objective:     Vital Signs (Most Recent):  Temp: 98 °F (36.7 °C) (12/31/23 0734)  Pulse: 96 (12/31/23 0734)  Resp: 17 (12/31/23 0734)  BP: (!) 140/72 (12/31/23 0734)  SpO2: 99 % (12/31/23 0734) Vital Signs (24h Range):  Temp:  [97.8 °F (36.6 °C)-99.3 °F (37.4 °C)] 98 °F (36.7 °C)  Pulse:  [] 96  Resp:  [16-20] 17  SpO2:  [92 %-100 %] 99 %  BP: (128-169)/(66-87) 140/72     Weight: 63.7 kg (140 lb 6.4 oz) (12/29/23 1650)  Body mass index is 21.35 kg/m².      Intake/Output Summary (Last 24 hours) at 12/31/2023 0824  Last data filed at 12/31/2023 0450  Gross per 24 hour   Intake 200 ml   Output 1500 ml   Net -1300 ml         Lines/Drains/Airways       Airway  Duration                  Airway - Non-Surgical 12/30/23 0818 Nasal Cannula 1 day              Peripheral Intravenous Line  Duration                  Peripheral IV - Single Lumen 12/29/23 1630 20 G Left Forearm 1 day         Peripheral IV - Single Lumen 12/29/23 1746 18 G Right Antecubital 1 day                    Physical Exam  Constitutional:       Appearance: Normal appearance.   HENT:      Head: Normocephalic and atraumatic.      Right Ear: External ear normal.      Left Ear: External ear normal.      Mouth/Throat:      Mouth: Mucous membranes are moist.   Eyes:      Extraocular Movements: Extraocular movements intact.      Pupils: Pupils are equal, round, and reactive to light.   Cardiovascular:      Rate and Rhythm: Normal rate and regular rhythm.      Pulses: Normal pulses.      Heart sounds: Normal heart sounds.   Pulmonary:      Effort: Pulmonary effort is normal.      Breath sounds: Normal breath sounds.   Abdominal:      General: Bowel sounds are normal. There is no distension.      Palpations: Abdomen is soft. There is no mass.  "     Tenderness: There is no abdominal tenderness.   Musculoskeletal:         General: Normal range of motion.      Cervical back: Normal range of motion and neck supple.   Neurological:      General: No focal deficit present.      Mental Status: He is alert and oriented to person, place, and time.         Significant Labs:  CBC:   Recent Labs   Lab 12/30/23  1259 12/30/23  1811 12/31/23  0406   WBC 8.69 6.09 4.00   HGB 7.9* 7.2* 7.1*   HCT 27.3* 24.5* 24.4*    240 252       CMP:   Recent Labs   Lab 12/30/23  0408   GLU 68*   CALCIUM 8.4*   ALBUMIN 2.8*   PROT 6.1      K 4.4   CO2 19*      BUN 15   CREATININE 1.2   ALKPHOS 133   ALT 13   AST 23   BILITOT 0.9       Coagulation: No results for input(s): "PT", "INR", "APTT" in the last 48 hours.      Assessment/Plan:     Active Diagnoses:    Diagnosis Date Noted POA    PRINCIPAL PROBLEM:  Upper GI bleed [K92.2] 12/29/2023 Yes    Chronic combined systolic and diastolic heart failure [I50.42] 12/29/2023 Yes    Primary hypertension [I10] 12/29/2023 Yes    History of total right hip replacement [Z96.641] 12/29/2023 Not Applicable      Problems Resolved During this Admission:     Melena  -Resolved  -Severe esophagitis on EGD  -Likely NSAIDs related  -Continue Protonix BID  -Transfuse to keep Hb >7.  -Follow up Outpatient. Plan EGD in 3 months    Chronic combined systolic and diastolic heart failure  -no signs of exacerbation  -monitor     Thank you for your consult. Gi will sign off    Obie Oakley MD  Gastroenterology  [facility]    "

## 2023-12-31 NOTE — HOSPITAL COURSE
The patient presented with epigastric pain with melena. He reports he was out of some ulcer medication and was on aspirin and ibuprofen. He was noted to have hemoglobin of 6.4.He was placed on IV PPI and transfused 1 unit of blood. GI was consulted and patient underwent EGD. Bleeding was noted and treated with Epi. GI okay with diet. Clears initiated and advanced as tolerated to cardiac. His hemoglobin was stable. He was discharged with Protonix and carafate. Outpatient f/u with GI and PCP.    Right femur fracture: S/p right CON 12/3, WBAT. Had some eschar at incision site. Laure removed by Dr. Barriga 12/30. Not given DVT prophylaxis as risks outweighs the benefit. Patient has had 2 EGDs since hip surgery and been transfused prior to both EGD with the last one revealing active bleeding treated with Epi.     Chronic combined heart failure: patient was euvolemic at time of discharge. Patient seen and examined, stable for discharge.

## 2023-12-31 NOTE — PLAN OF CARE
312/312 KAHSMIR Johnson Jr. is a 58 y.o.male admitted on 12/29/2023 for Acute upper GI bleed   Code Status: Full Code MRN: [id number]   Review of patient's allergies indicates:   Allergen Reactions    Milk containing products (dairy)      Past Medical History:   Diagnosis Date    Anemia     Arthritis     CHF (congestive heart failure)     Encounter for blood transfusion     HTN (hypertension)     Paroxysmal SVT (supraventricular tachycardia)       PRN meds    0.9%  NaCl infusion (for blood administration), , Q24H PRN  glucagon (human recombinant), 1 mg, PRN  glucose, 16 g, PRN  glucose, 24 g, PRN  guaiFENesin 100 mg/5 ml, 200 mg, Q4H PRN  HYDROcodone-acetaminophen, 1 tablet, Q6H PRN  labetaloL, 10 mg, Q6H PRN  morphine, 4 mg, Q4H PRN  naloxone, 0.4 mg, PRN  ondansetron, 4 mg, Q6H PRN  sodium chloride 0.9%, 10 mL, Q12H PRN      Chart check completed. Will continue plan of care. Anticipated discharge today.     Orientation: oriented x 4  [place] Coma Scale Score: 15     Lead Monitored: Lead II Rhythm: normal sinus rhythm    Cardiac/Telemetry Box Number: 0  VTE Required Core Measure: (SCDs) Sequential compression device initiated/maintained Last Bowel Movement: 12/28/23  Diet Cardiac Standard Tray  Voiding Characteristics: incontinence, other (see comments), voids spontaneously without difficulty (at times)  Ashok Score: 19  Fall Risk Score: 15  Accucheck []   Freq?      Lines/Drains/Airways       Peripheral Intravenous Line  Duration                  Peripheral IV - Single Lumen 12/29/23 1630 20 G Left Forearm 2 days         Peripheral IV - Single Lumen 12/29/23 1746 18 G Right Antecubital 1 day                    Problem: Adult Inpatient Plan of Care  Goal: Plan of Care Review  12/31/2023 1706 by Jeanine Issa, RN  Outcome: Met  12/31/2023 1547 by Jeanine Issa, RN  Outcome: Ongoing, Progressing  Goal: Patient-Specific Goal (Individualized)  Outcome: Met  Goal: Absence of Hospital-Acquired Illness or  Injury  Outcome: Met  Goal: Optimal Comfort and Wellbeing  Outcome: Met  Goal: Readiness for Transition of Care  Outcome: Met     Problem: Adjustment to Illness (Gastrointestinal Bleeding)  Goal: Optimal Coping with Acute Illness  Outcome: Met     Problem: Bleeding (Gastrointestinal Bleeding)  Goal: Hemostasis  Outcome: Met     Problem: Skin Injury Risk Increased  Goal: Skin Health and Integrity  Outcome: Met

## 2023-12-31 NOTE — PLAN OF CARE
312/312 KASHMIR Johnson Jr. is a 58 y.o.male admitted on 12/29/2023 for Acute upper GI bleed   Code Status: Full Code MRN: [id number]   Review of patient's allergies indicates:   Allergen Reactions    Milk containing products (dairy)      Past Medical History:   Diagnosis Date    Anemia     Arthritis     CHF (congestive heart failure)     Encounter for blood transfusion     HTN (hypertension)     Paroxysmal SVT (supraventricular tachycardia)       PRN meds    0.9%  NaCl infusion (for blood administration), , Q24H PRN  glucagon (human recombinant), 1 mg, PRN  glucose, 16 g, PRN  glucose, 24 g, PRN  guaiFENesin 100 mg/5 ml, 200 mg, Q4H PRN  HYDROcodone-acetaminophen, 1 tablet, Q6H PRN  labetaloL, 10 mg, Q6H PRN  morphine, 4 mg, Q4H PRN  naloxone, 0.4 mg, PRN  ondansetron, 4 mg, Q6H PRN  sodium chloride 0.9%, 10 mL, Q12H PRN      Chart check completed. Will continue plan of care. No change in patient's status during shift.     Orientation: oriented x 4  [place] Coma Scale Score: 15     Lead Monitored: Lead II Rhythm: normal sinus rhythm    Cardiac/Telemetry Box Number: [id number]  VTE Required Core Measure: (SCDs) Sequential compression device initiated/maintained Last Bowel Movement: 12/28/23  Diet Cardiac Standard Tray  Voiding Characteristics: incontinence, other (see comments), voids spontaneously without difficulty (at times)  Ashok Score: 19  Fall Risk Score: 15  Accucheck []   Freq?      Lines/Drains/Airways       Peripheral Intravenous Line  Duration                  Peripheral IV - Single Lumen 12/29/23 1630 20 G Left Forearm 1 day         Peripheral IV - Single Lumen 12/29/23 1746 18 G Right Antecubital 1 day                    Problem: Adult Inpatient Plan of Care  Goal: Plan of Care Review  Outcome: Ongoing, Progressing

## 2023-12-31 NOTE — PLAN OF CARE
312/312 KASHMIR Johnson Jr. is a 58 y.o.male admitted on 12/29/2023 for Upper GI bleed   Code Status: Full Code MRN: [id number]   Review of patient's allergies indicates:   Allergen Reactions    Milk containing products (dairy)      Past Medical History:   Diagnosis Date    Anemia     Arthritis     CHF (congestive heart failure)     Encounter for blood transfusion     HTN (hypertension)     Paroxysmal SVT (supraventricular tachycardia)       PRN meds    0.9%  NaCl infusion (for blood administration), , Q24H PRN  glucagon (human recombinant), 1 mg, PRN  glucose, 16 g, PRN  glucose, 24 g, PRN  HYDROcodone-acetaminophen, 1 tablet, Q6H PRN  labetaloL, 10 mg, Q6H PRN  morphine, 4 mg, Q4H PRN  naloxone, 0.4 mg, PRN  ondansetron, 4 mg, Q6H PRN  sodium chloride 0.9%, 10 mL, Q12H PRN      Chart check completed. Will continue plan of care. Patient had EGD today- clear liquid diet if tolerated advance diet- no nausea or emesis noted- PRN norco 10 ordered today.        Maurisio Coma Scale Score: 15     Lead Monitored: Lead II Rhythm: normal sinus rhythm    Cardiac/Telemetry Box Number: [id number]  VTE Required Core Measure: (SCDs) Sequential compression device initiated/maintained Last Bowel Movement: 12/28/23  Diet clear liquid  Voiding Characteristics: incontinence, other (see comments) (at times)  Ashok Score: 17  Fall Risk Score: 15  Accucheck []   Freq?      Lines/Drains/Airways       Airway  Duration                  Airway - Non-Surgical 12/30/23 0818 Nasal Cannula <1 day              Peripheral Intravenous Line  Duration                  Peripheral IV - Single Lumen 12/29/23 1630 20 G Left Forearm 1 day         Peripheral IV - Single Lumen 12/29/23 1746 18 G Right Antecubital 1 day                    Problem: Adult Inpatient Plan of Care  Goal: Plan of Care Review  Outcome: Ongoing, Progressing

## 2023-12-31 NOTE — ASSESSMENT & PLAN NOTE
-appreciate GI  -s/p EGD: severe esophagitis with bleeding, treated with epi  -continue IV PPI  -resumed carafate  -transfused unit of blood  -serial monitoring of H/H

## 2024-04-01 PROBLEM — K92.2 ACUTE UPPER GI BLEED: Status: RESOLVED | Noted: 2023-12-29 | Resolved: 2024-04-01
